# Patient Record
Sex: FEMALE | Race: WHITE | NOT HISPANIC OR LATINO | URBAN - METROPOLITAN AREA
[De-identification: names, ages, dates, MRNs, and addresses within clinical notes are randomized per-mention and may not be internally consistent; named-entity substitution may affect disease eponyms.]

---

## 2024-09-09 ENCOUNTER — APPOINTMENT (OUTPATIENT)
Dept: CT IMAGING | Facility: HOSPITAL | Age: 29
End: 2024-09-09
Payer: COMMERCIAL

## 2024-09-09 ENCOUNTER — HOSPITAL ENCOUNTER (EMERGENCY)
Facility: HOSPITAL | Age: 29
Discharge: HOME | End: 2024-09-09
Attending: EMERGENCY MEDICINE | Admitting: EMERGENCY MEDICINE
Payer: COMMERCIAL

## 2024-09-09 VITALS
HEIGHT: 66 IN | HEART RATE: 104 BPM | TEMPERATURE: 98.7 F | BODY MASS INDEX: 40.18 KG/M2 | WEIGHT: 250 LBS | SYSTOLIC BLOOD PRESSURE: 194 MMHG | OXYGEN SATURATION: 99 % | DIASTOLIC BLOOD PRESSURE: 134 MMHG | RESPIRATION RATE: 16 BRPM

## 2024-09-09 DIAGNOSIS — N39.0 ACUTE UTI: ICD-10-CM

## 2024-09-09 DIAGNOSIS — R10.84 GENERALIZED ABDOMINAL PAIN: Primary | ICD-10-CM

## 2024-09-09 LAB
ALBUMIN SERPL-MCNC: 3.7 G/DL (ref 3.5–5.2)
ALBUMIN/GLOB SERPL: 1 G/DL
ALP SERPL-CCNC: 81 U/L (ref 39–117)
ALT SERPL W P-5'-P-CCNC: 12 U/L (ref 1–33)
ANION GAP SERPL CALCULATED.3IONS-SCNC: 15 MMOL/L (ref 5–15)
AST SERPL-CCNC: 18 U/L (ref 1–32)
B-HCG UR QL: NEGATIVE
BACTERIA UR QL AUTO: ABNORMAL /HPF
BASOPHILS # BLD AUTO: 0.02 10*3/MM3 (ref 0–0.2)
BASOPHILS NFR BLD AUTO: 0.4 % (ref 0–1.5)
BILIRUB SERPL-MCNC: 0.5 MG/DL (ref 0–1.2)
BILIRUB UR QL STRIP: NEGATIVE
BUN SERPL-MCNC: 20 MG/DL (ref 6–20)
BUN/CREAT SERPL: 29.9 (ref 7–25)
CALCIUM SPEC-SCNC: 8.6 MG/DL (ref 8.6–10.5)
CHLORIDE SERPL-SCNC: 101 MMOL/L (ref 98–107)
CLARITY UR: ABNORMAL
CO2 SERPL-SCNC: 20 MMOL/L (ref 22–29)
COLOR UR: ABNORMAL
CREAT SERPL-MCNC: 0.67 MG/DL (ref 0.57–1)
DEPRECATED RDW RBC AUTO: 44 FL (ref 37–54)
EGFRCR SERPLBLD CKD-EPI 2021: 120.8 ML/MIN/1.73
EOSINOPHIL # BLD AUTO: 0 10*3/MM3 (ref 0–0.4)
EOSINOPHIL NFR BLD AUTO: 0 % (ref 0.3–6.2)
ERYTHROCYTE [DISTWIDTH] IN BLOOD BY AUTOMATED COUNT: 13.3 % (ref 12.3–15.4)
EXPIRATION DATE: NORMAL
FLUAV SUBTYP SPEC NAA+PROBE: NOT DETECTED
FLUBV RNA ISLT QL NAA+PROBE: NOT DETECTED
GLOBULIN UR ELPH-MCNC: 3.8 GM/DL
GLUCOSE SERPL-MCNC: 87 MG/DL (ref 65–99)
GLUCOSE UR STRIP-MCNC: NEGATIVE MG/DL
HCT VFR BLD AUTO: 41.1 % (ref 34–46.6)
HGB BLD-MCNC: 13.8 G/DL (ref 12–15.9)
HGB UR QL STRIP.AUTO: NEGATIVE
HYALINE CASTS UR QL AUTO: ABNORMAL /LPF
IMM GRANULOCYTES # BLD AUTO: 0.01 10*3/MM3 (ref 0–0.05)
IMM GRANULOCYTES NFR BLD AUTO: 0.2 % (ref 0–0.5)
INTERNAL NEGATIVE CONTROL: NEGATIVE
INTERNAL POSITIVE CONTROL: POSITIVE
KETONES UR QL STRIP: ABNORMAL
LEUKOCYTE ESTERASE UR QL STRIP.AUTO: ABNORMAL
LIPASE SERPL-CCNC: 21 U/L (ref 13–60)
LYMPHOCYTES # BLD AUTO: 1.03 10*3/MM3 (ref 0.7–3.1)
LYMPHOCYTES NFR BLD AUTO: 18.5 % (ref 19.6–45.3)
Lab: NORMAL
MCH RBC QN AUTO: 30.2 PG (ref 26.6–33)
MCHC RBC AUTO-ENTMCNC: 33.6 G/DL (ref 31.5–35.7)
MCV RBC AUTO: 89.9 FL (ref 79–97)
MONOCYTES # BLD AUTO: 0.34 10*3/MM3 (ref 0.1–0.9)
MONOCYTES NFR BLD AUTO: 6.1 % (ref 5–12)
MUCOUS THREADS URNS QL MICRO: ABNORMAL /HPF
NEUTROPHILS NFR BLD AUTO: 4.18 10*3/MM3 (ref 1.7–7)
NEUTROPHILS NFR BLD AUTO: 74.8 % (ref 42.7–76)
NITRITE UR QL STRIP: NEGATIVE
NRBC BLD AUTO-RTO: 0 /100 WBC (ref 0–0.2)
PH UR STRIP.AUTO: 5.5 [PH] (ref 5–8)
PLATELET # BLD AUTO: 204 10*3/MM3 (ref 140–450)
PMV BLD AUTO: 9.5 FL (ref 6–12)
POTASSIUM SERPL-SCNC: 3.9 MMOL/L (ref 3.5–5.2)
PROT SERPL-MCNC: 7.5 G/DL (ref 6–8.5)
PROT UR QL STRIP: ABNORMAL
RBC # BLD AUTO: 4.57 10*6/MM3 (ref 3.77–5.28)
RBC # UR STRIP: ABNORMAL /HPF
REF LAB TEST METHOD: ABNORMAL
SARS-COV-2 RNA RESP QL NAA+PROBE: NOT DETECTED
SODIUM SERPL-SCNC: 136 MMOL/L (ref 136–145)
SP GR UR STRIP: 1.04 (ref 1–1.03)
SQUAMOUS #/AREA URNS HPF: ABNORMAL /HPF
UROBILINOGEN UR QL STRIP: ABNORMAL
WBC # UR STRIP: ABNORMAL /HPF
WBC NRBC COR # BLD AUTO: 5.58 10*3/MM3 (ref 3.4–10.8)

## 2024-09-09 PROCEDURE — 96365 THER/PROPH/DIAG IV INF INIT: CPT

## 2024-09-09 PROCEDURE — 87636 SARSCOV2 & INF A&B AMP PRB: CPT | Performed by: EMERGENCY MEDICINE

## 2024-09-09 PROCEDURE — 87086 URINE CULTURE/COLONY COUNT: CPT | Performed by: EMERGENCY MEDICINE

## 2024-09-09 PROCEDURE — 25810000003 SODIUM CHLORIDE 0.9 % SOLUTION: Performed by: EMERGENCY MEDICINE

## 2024-09-09 PROCEDURE — 83690 ASSAY OF LIPASE: CPT | Performed by: EMERGENCY MEDICINE

## 2024-09-09 PROCEDURE — 74177 CT ABD & PELVIS W/CONTRAST: CPT

## 2024-09-09 PROCEDURE — 81025 URINE PREGNANCY TEST: CPT | Performed by: EMERGENCY MEDICINE

## 2024-09-09 PROCEDURE — 25010000002 ONDANSETRON PER 1 MG: Performed by: EMERGENCY MEDICINE

## 2024-09-09 PROCEDURE — 80053 COMPREHEN METABOLIC PANEL: CPT | Performed by: EMERGENCY MEDICINE

## 2024-09-09 PROCEDURE — 25010000002 HYDROMORPHONE PER 4 MG: Performed by: EMERGENCY MEDICINE

## 2024-09-09 PROCEDURE — 25010000002 CEFTRIAXONE PER 250 MG: Performed by: EMERGENCY MEDICINE

## 2024-09-09 PROCEDURE — 96375 TX/PRO/DX INJ NEW DRUG ADDON: CPT

## 2024-09-09 PROCEDURE — 99285 EMERGENCY DEPT VISIT HI MDM: CPT

## 2024-09-09 PROCEDURE — 81001 URINALYSIS AUTO W/SCOPE: CPT | Performed by: EMERGENCY MEDICINE

## 2024-09-09 PROCEDURE — 25510000001 IOPAMIDOL 61 % SOLUTION: Performed by: EMERGENCY MEDICINE

## 2024-09-09 PROCEDURE — 85025 COMPLETE CBC W/AUTO DIFF WBC: CPT | Performed by: EMERGENCY MEDICINE

## 2024-09-09 RX ORDER — IOPAMIDOL 612 MG/ML
100 INJECTION, SOLUTION INTRAVASCULAR
Status: COMPLETED | OUTPATIENT
Start: 2024-09-09 | End: 2024-09-09

## 2024-09-09 RX ORDER — CEFUROXIME AXETIL 250 MG/1
500 TABLET ORAL 2 TIMES DAILY
Qty: 28 TABLET | Refills: 0 | Status: SHIPPED | OUTPATIENT
Start: 2024-09-09 | End: 2024-09-16

## 2024-09-09 RX ORDER — HYDROMORPHONE HYDROCHLORIDE 1 MG/ML
0.5 INJECTION, SOLUTION INTRAMUSCULAR; INTRAVENOUS; SUBCUTANEOUS ONCE
Status: COMPLETED | OUTPATIENT
Start: 2024-09-09 | End: 2024-09-09

## 2024-09-09 RX ORDER — SODIUM CHLORIDE 0.9 % (FLUSH) 0.9 %
10 SYRINGE (ML) INJECTION AS NEEDED
Status: DISCONTINUED | OUTPATIENT
Start: 2024-09-09 | End: 2024-09-09 | Stop reason: HOSPADM

## 2024-09-09 RX ORDER — ONDANSETRON 4 MG/1
4 TABLET, FILM COATED ORAL EVERY 8 HOURS PRN
Qty: 15 TABLET | Refills: 0 | Status: SHIPPED | OUTPATIENT
Start: 2024-09-09

## 2024-09-09 RX ORDER — DICYCLOMINE HCL 20 MG
20 TABLET ORAL EVERY 8 HOURS PRN
Qty: 20 TABLET | Refills: 0 | Status: SHIPPED | OUTPATIENT
Start: 2024-09-09

## 2024-09-09 RX ORDER — ONDANSETRON 2 MG/ML
4 INJECTION INTRAMUSCULAR; INTRAVENOUS ONCE
Status: COMPLETED | OUTPATIENT
Start: 2024-09-09 | End: 2024-09-09

## 2024-09-09 RX ADMIN — SODIUM CHLORIDE 1000 ML: 9 INJECTION, SOLUTION INTRAVENOUS at 16:47

## 2024-09-09 RX ADMIN — HYDROMORPHONE HYDROCHLORIDE 0.5 MG: 1 INJECTION, SOLUTION INTRAMUSCULAR; INTRAVENOUS; SUBCUTANEOUS at 16:47

## 2024-09-09 RX ADMIN — SODIUM CHLORIDE 2000 MG: 900 INJECTION INTRAVENOUS at 17:27

## 2024-09-09 RX ADMIN — ONDANSETRON 4 MG: 2 INJECTION INTRAMUSCULAR; INTRAVENOUS at 16:47

## 2024-09-09 RX ADMIN — IOPAMIDOL 90 ML: 612 INJECTION, SOLUTION INTRAVENOUS at 17:43

## 2024-09-09 NOTE — ED PROVIDER NOTES
Subjective   History of Present Illness  29-year-old female sent to the emergency department from urgent care to be evaluated for right lower quadrant abdominal pain.  The patient tells me that her symptoms started yesterday evening and have persisted since that time.  The pain seems to be worst in her right lower quadrant and radiates to her back.  She notes accompanying nausea and vomiting and some loose stools as well.  No known dietary triggers for her symptoms.  No known sick contacts.  She currently rates her pain at 8 out of 10 in severity and notes that the pain is worse with palpation.  She went to urgent care regarding her symptoms and there was both febrile and tachycardic.  There was concern for potential appendicitis so she was sent to the ED to be evaluated.      Review of Systems   Constitutional:  Positive for fever.   Gastrointestinal:  Positive for abdominal pain, diarrhea, nausea and vomiting.   All other systems reviewed and are negative.      No past medical history on file.    Allergies   Allergen Reactions    Minocycline Other (See Comments)     Hydrocephalus as child    Red Dye Other (See Comments)     Severe vomiting       No past surgical history on file.    No family history on file.    Social History     Socioeconomic History    Marital status: Single           Objective   Physical Exam  Vitals and nursing note reviewed.   Constitutional:       General: She is not in acute distress.     Appearance: She is well-developed. She is not diaphoretic.      Comments: Nontoxic-appearing female   HENT:      Head: Normocephalic and atraumatic.   Eyes:      Pupils: Pupils are equal, round, and reactive to light.   Cardiovascular:      Rate and Rhythm: Regular rhythm. Tachycardia present.      Heart sounds: Normal heart sounds. No murmur heard.     No friction rub. No gallop.   Pulmonary:      Effort: Pulmonary effort is normal. No respiratory distress.      Breath sounds: Normal breath sounds. No  wheezing or rales.   Abdominal:      General: Bowel sounds are normal. There is no distension.      Palpations: Abdomen is soft. There is no mass.      Tenderness: There is abdominal tenderness. There is guarding. There is no rebound.      Comments: Generalized abdominal tenderness present, guarding noted with palpation of right lower quadrant, no pain out of proportion to exam, negative Bartlett's sign   Genitourinary:     Comments: No CVA tenderness noted  Musculoskeletal:         General: Normal range of motion.      Cervical back: Neck supple.   Skin:     General: Skin is warm and dry.      Findings: No erythema or rash.      Comments: No dermatomal rash present   Neurological:      Mental Status: She is alert and oriented to person, place, and time.   Psychiatric:         Mood and Affect: Mood normal.         Thought Content: Thought content normal.         Judgment: Judgment normal.         Procedures           ED Course  ED Course as of 09/09/24 1806   Mon Sep 09, 2024   1602 29-year-old female sent to the emergency department to be evaluated for right lower quadrant abdominal pain.  She tells me her symptoms started yesterday evening and have persisted since that time.  The pain seems to be worst in her right lower quadrant and radiates to her back.  She endorses coming nausea and vomiting.  No known sick contacts or dietary triggers.  On arrival to the ED, the patient is uncomfortable appearing.  She is tachycardic.  Exam remarkable for generalized abdominal tenderness with focal tenderness noted with palpation of right lower quadrant with guarding noted.  No pain out of proportion to exam.  No CVA tenderness noted.  No dermatomal rash present.  Broad differential diagnosis.  We will obtain labs and imaging, and we will reassess following initial interventions. [DD]   1718 Labs are bland/unrevealing. [DD]   1721 Urinalysis is suggestive of infection.  Urine culture obtained.  Rocephin given. [DD]   1759 I  personally and independently reviewed the patient's CT images and findings, and I am in agreement with the radiologist regarding CT interpretation--particularly regarding findings somewhat equivocal but unlikely for appendicitis.    Upon reevaluation, the patient looks much improved.  She is requesting to eat and drink.  Her abdominal tenderness at this point seems more generalized than localized to her right lower quadrant. [DD]   1800 After reviewing the patient's labs and imaging, I discussed the patient's case with our on-call surgeon, Dr. Resendez. [DD]   1800 He agreed that given the patient's CT findings and overall clinical picture, that acute appendicitis was unlikely.  He felt that the patient could be discharged home with appropriate strict return precautions [DD]   1800 .  I agree with his expert assessment.  Prescription for Bentyl and Zofran as needed.  The patient will follow-up with her primary care physician within the next week.  Agreeable with plan and given appropriate strict return precautions. [DD]   1800 Prescription for Ceftin for UTI. [DD]      ED Course User Index  [DD] Arnol Ledesma MD                                   Recent Results (from the past 24 hour(s))   Covid-19 + Flu A&B AG, Veritor ([id number])    Collection Time: 09/09/24  2:51 PM    Specimen: Swab   Result Value Ref Range    SARS Antigen Not Detected Not Detected, Presumptive Negative    Influenza A Antigen RASHID Not Detected Not Detected    Influenza B Antigen RASHID Not Detected Not Detected    Internal Control Passed Passed    Lot Number [id number]     Expiration Date 02/10/2025    Comprehensive Metabolic Panel    Collection Time: 09/09/24  4:39 PM    Specimen: Blood   Result Value Ref Range    Glucose 87 65 - 99 mg/dL    BUN 20 6 - 20 mg/dL    Creatinine 0.67 0.57 - 1.00 mg/dL    Sodium 136 136 - 145 mmol/L    Potassium 3.9 3.5 - 5.2 mmol/L    Chloride 101 98 - 107 mmol/L    CO2 20.0 (L) 22.0 - 29.0 mmol/L    Calcium 8.6 8.6 -  10.5 mg/dL    Total Protein 7.5 6.0 - 8.5 g/dL    Albumin 3.7 3.5 - 5.2 g/dL    ALT (SGPT) 12 1 - 33 U/L    AST (SGOT) 18 1 - 32 U/L    Alkaline Phosphatase 81 39 - 117 U/L    Total Bilirubin 0.5 0.0 - 1.2 mg/dL    Globulin 3.8 gm/dL    A/G Ratio 1.0 g/dL    BUN/Creatinine Ratio 29.9 (H) 7.0 - 25.0    Anion Gap 15.0 5.0 - 15.0 mmol/L    eGFR 120.8 >60.0 mL/min/1.73   Lipase    Collection Time: 09/09/24  4:39 PM    Specimen: Blood   Result Value Ref Range    Lipase 21 13 - 60 U/L   Urinalysis With Culture If Indicated - Urine, Clean Catch    Collection Time: 09/09/24  4:39 PM    Specimen: Urine, Clean Catch   Result Value Ref Range    Color, UA Dark Yellow (A) Yellow, Straw    Appearance, UA Turbid (A) Clear    pH, UA 5.5 5.0 - 8.0    Specific Gravity, UA 1.038 (H) 1.001 - 1.030    Glucose, UA Negative Negative    Ketones, UA Trace (A) Negative    Bilirubin, UA Negative Negative    Blood, UA Negative Negative    Protein, UA 30 mg/dL (1+) (A) Negative    Leuk Esterase, UA Moderate (2+) (A) Negative    Nitrite, UA Negative Negative    Urobilinogen, UA 0.2 E.U./dL 0.2 - 1.0 E.U./dL   CBC Auto Differential    Collection Time: 09/09/24  4:39 PM    Specimen: Blood   Result Value Ref Range    WBC 5.58 3.40 - 10.80 10*3/mm3    RBC 4.57 3.77 - 5.28 10*6/mm3    Hemoglobin 13.8 12.0 - 15.9 g/dL    Hematocrit 41.1 34.0 - 46.6 %    MCV 89.9 79.0 - 97.0 fL    MCH 30.2 26.6 - 33.0 pg    MCHC 33.6 31.5 - 35.7 g/dL    RDW 13.3 12.3 - 15.4 %    RDW-SD 44.0 37.0 - 54.0 fl    MPV 9.5 6.0 - 12.0 fL    Platelets 204 140 - 450 10*3/mm3    Neutrophil % 74.8 42.7 - 76.0 %    Lymphocyte % 18.5 (L) 19.6 - 45.3 %    Monocyte % 6.1 5.0 - 12.0 %    Eosinophil % 0.0 (L) 0.3 - 6.2 %    Basophil % 0.4 0.0 - 1.5 %    Immature Grans % 0.2 0.0 - 0.5 %    Neutrophils, Absolute 4.18 1.70 - 7.00 10*3/mm3    Lymphocytes, Absolute 1.03 0.70 - 3.10 10*3/mm3    Monocytes, Absolute 0.34 0.10 - 0.90 10*3/mm3    Eosinophils, Absolute 0.00 0.00 - 0.40 10*3/mm3     Basophils, Absolute 0.02 0.00 - 0.20 10*3/mm3    Immature Grans, Absolute 0.01 0.00 - 0.05 10*3/mm3    nRBC 0.0 0.0 - 0.2 /100 WBC   Urinalysis, Microscopic Only - Urine, Clean Catch    Collection Time: 09/09/24  4:39 PM    Specimen: Urine, Clean Catch   Result Value Ref Range    RBC, UA 0-2 None Seen, 0-2 /HPF    WBC, UA 11-20 (A) None Seen, 0-2 /HPF    Bacteria, UA 4+ (A) None Seen, Trace /HPF    Squamous Epithelial Cells, UA 21-30 (A) None Seen, 0-2 /HPF    Hyaline Casts, UA 0-6 0 - 6 /LPF    Mucus, UA Moderate/2+ (A) None Seen, Trace /HPF    Methodology Manual Light Microscopy    POC Urine Pregnancy    Collection Time: 09/09/24  4:40 PM    Specimen: Urine   Result Value Ref Range    HCG, Urine, QL Negative Negative    Lot Number [id number]     Internal Positive Control Positive Positive, Passed    Internal Negative Control Negative Negative, Passed    Expiration Date 2025-01-28    COVID-19 and FLU A/B PCR, 1 HR TAT - Swab, Nasopharynx    Collection Time: 09/09/24  4:41 PM    Specimen: Nasopharynx; Swab   Result Value Ref Range    COVID19 Not Detected Not Detected - Ref. Range    Influenza A PCR Not Detected Not Detected    Influenza B PCR Not Detected Not Detected     Note: In addition to lab results from this visit, the labs listed above may include labs taken at another facility or during a different encounter within the last 24 hours. Please correlate lab times with ED admission and discharge times for further clarification of the services performed during this visit.    CT Abdomen Pelvis With Contrast   Final Result   Impression:   1.Scattered colonic fluid may indicate acute diarrheal illness.   2.Fluid-filled appendix at upper limit of normal in caliber without periappendiceal stranding noted. Appearance is favored secondary to changes of the colon. Early appendicitis is considered less likely. Please correlate clinically.   3.Additional findings as detailed above.         Electronically Signed: Corey  "MD Gopal     9/9/2024 5:53 PM EDT     Workstation ID: [id number]        Vitals:    09/09/24 1556 09/09/24 1700   BP: (!) 139/101 (!) 141/106   BP Location: Left arm    Patient Position: Sitting    Pulse: (!) 126 108   Resp: 16    Temp: 98.7 °F (37.1 °C)    TempSrc: Oral    SpO2: 98% 96%   Weight: 113 kg (250 lb)    Height: 167.6 cm (66\")      Medications   sodium chloride 0.9 % flush 10 mL (has no administration in time range)   sodium chloride 0.9 % flush 10 mL (has no administration in time range)   sodium chloride 0.9 % bolus 1,000 mL (0 mL Intravenous Stopped 9/9/24 1803)   HYDROmorphone (DILAUDID) injection 0.5 mg (0.5 mg Intravenous Given 9/9/24 1647)   ondansetron (ZOFRAN) injection 4 mg (4 mg Intravenous Given 9/9/24 1647)   cefTRIAXone (ROCEPHIN) 2,000 mg in sodium chloride 0.9 % 100 mL MBP (2,000 mg Intravenous New Bag 9/9/24 1727)   iopamidol (ISOVUE-300) 61 % injection 100 mL (90 mL Intravenous Given 9/9/24 1743)     ECG/EMG Results (last 24 hours)       ** No results found for the last 24 hours. **          No orders to display                 Medical Decision Making  Problems Addressed:  Acute UTI: complicated acute illness or injury  Generalized abdominal pain: complicated acute illness or injury    Amount and/or Complexity of Data Reviewed  Labs: ordered.  Radiology: ordered.    Risk  Prescription drug management.        Final diagnoses:   Generalized abdominal pain   Acute UTI       ED Disposition  ED Disposition       ED Disposition   Discharge    Condition   Stable    Comment   --               PATIENT CONNECTION - [facility] [address]  [phone]  In 1 week           Medication List        New Prescriptions      cefuroxime 250 MG tablet  Commonly known as: CEFTIN  Take 2 tablets by mouth 2 (Two) Times a Day for 7 days.     dicyclomine 20 MG tablet  Commonly known as: BENTYL  Take 1 tablet by mouth Every 8 (Eight) Hours As Needed for Abdominal Cramping.     ondansetron 4 MG " tablet  Commonly known as: ZOFRAN  Take 1 tablet by mouth Every 8 (Eight) Hours As Needed for Nausea.               Where to Get Your Medications        These medications were sent to [facility] - [place], [place] - [address]  - [phone]  - [phone]   [address], [facility] [address]      Phone: [phone]   cefuroxime 250 MG tablet  dicyclomine 20 MG tablet  ondansetron 4 MG tablet            Arnol Ledesma MD  09/09/24 8219

## 2024-09-10 LAB — BACTERIA SPEC AEROBE CULT: NORMAL

## 2024-10-04 ENCOUNTER — OFFICE VISIT (OUTPATIENT)
Dept: FAMILY MEDICINE CLINIC | Facility: CLINIC | Age: 29
End: 2024-10-04
Payer: COMMERCIAL

## 2024-10-04 VITALS
HEIGHT: 66 IN | BODY MASS INDEX: 43.23 KG/M2 | HEART RATE: 82 BPM | SYSTOLIC BLOOD PRESSURE: 165 MMHG | DIASTOLIC BLOOD PRESSURE: 104 MMHG | OXYGEN SATURATION: 96 % | WEIGHT: 269 LBS

## 2024-10-04 DIAGNOSIS — N30.00 ACUTE CYSTITIS WITHOUT HEMATURIA: ICD-10-CM

## 2024-10-04 DIAGNOSIS — I10 PRIMARY HYPERTENSION: Primary | ICD-10-CM

## 2024-10-04 DIAGNOSIS — E66.01 CLASS 3 SEVERE OBESITY DUE TO EXCESS CALORIES WITHOUT SERIOUS COMORBIDITY WITH BODY MASS INDEX (BMI) OF 40.0 TO 44.9 IN ADULT: ICD-10-CM

## 2024-10-04 DIAGNOSIS — E66.813 CLASS 3 SEVERE OBESITY DUE TO EXCESS CALORIES WITHOUT SERIOUS COMORBIDITY WITH BODY MASS INDEX (BMI) OF 40.0 TO 44.9 IN ADULT: ICD-10-CM

## 2024-10-04 DIAGNOSIS — E73.9: ICD-10-CM

## 2024-10-04 DIAGNOSIS — F41.8 DEPRESSION WITH ANXIETY: ICD-10-CM

## 2024-10-04 DIAGNOSIS — L20.82 FLEXURAL ECZEMA: ICD-10-CM

## 2024-10-04 DIAGNOSIS — Z23 ENCOUNTER FOR IMMUNIZATION: ICD-10-CM

## 2024-10-04 PROBLEM — K21.9 GASTROESOPHAGEAL REFLUX DISEASE: Status: ACTIVE | Noted: 2021-08-15

## 2024-10-04 PROBLEM — K58.9 ADAPTIVE COLITIS: Status: ACTIVE | Noted: 2024-10-04

## 2024-10-04 PROBLEM — R56.9 SEIZURES: Status: ACTIVE | Noted: 2021-10-01

## 2024-10-04 LAB
BILIRUB BLD-MCNC: NEGATIVE MG/DL
CLARITY, POC: ABNORMAL
COLOR UR: YELLOW
EXPIRATION DATE: ABNORMAL
GLUCOSE UR STRIP-MCNC: NEGATIVE MG/DL
KETONES UR QL: NEGATIVE
LEUKOCYTE EST, POC: ABNORMAL
Lab: ABNORMAL
NITRITE UR-MCNC: NEGATIVE MG/ML
PH UR: 7 [PH] (ref 5–8)
PROT UR STRIP-MCNC: ABNORMAL MG/DL
RBC # UR STRIP: ABNORMAL /UL
SP GR UR: 1.01 (ref 1–1.03)
UROBILINOGEN UR QL: NORMAL

## 2024-10-04 PROCEDURE — 87798 DETECT AGENT NOS DNA AMP: CPT | Performed by: FAMILY MEDICINE

## 2024-10-04 PROCEDURE — 87086 URINE CULTURE/COLONY COUNT: CPT | Performed by: FAMILY MEDICINE

## 2024-10-04 PROCEDURE — 99204 OFFICE O/P NEW MOD 45 MIN: CPT | Performed by: FAMILY MEDICINE

## 2024-10-04 PROCEDURE — 87591 N.GONORRHOEAE DNA AMP PROB: CPT | Performed by: FAMILY MEDICINE

## 2024-10-04 PROCEDURE — 81003 URINALYSIS AUTO W/O SCOPE: CPT | Performed by: FAMILY MEDICINE

## 2024-10-04 PROCEDURE — 87661 TRICHOMONAS VAGINALIS AMPLIF: CPT | Performed by: FAMILY MEDICINE

## 2024-10-04 PROCEDURE — 87491 CHLMYD TRACH DNA AMP PROBE: CPT | Performed by: FAMILY MEDICINE

## 2024-10-04 PROCEDURE — 87801 DETECT AGNT MULT DNA AMPLI: CPT | Performed by: FAMILY MEDICINE

## 2024-10-04 PROCEDURE — 90656 IIV3 VACC NO PRSV 0.5 ML IM: CPT | Performed by: FAMILY MEDICINE

## 2024-10-04 PROCEDURE — 90471 IMMUNIZATION ADMIN: CPT | Performed by: FAMILY MEDICINE

## 2024-10-04 RX ORDER — LISINOPRIL 10 MG/1
10 TABLET ORAL DAILY
Qty: 30 TABLET | Refills: 5 | Status: SHIPPED | OUTPATIENT
Start: 2024-10-04

## 2024-10-04 RX ORDER — TRIAMCINOLONE ACETONIDE 1 MG/G
1 OINTMENT TOPICAL 2 TIMES DAILY
Qty: 453.6 G | Refills: 5 | Status: SHIPPED | OUTPATIENT
Start: 2024-10-04

## 2024-10-04 RX ORDER — ESCITALOPRAM OXALATE 10 MG/1
10 TABLET ORAL DAILY
Qty: 30 TABLET | Refills: 5 | Status: SHIPPED | OUTPATIENT
Start: 2024-10-04

## 2024-10-04 NOTE — ASSESSMENT & PLAN NOTE
Patient's depression is a recurrent episode that is mild without psychosis. Depression is active and newly identified.    Plan:   Begin new antidepressant medicine; Lexapro    Followup in 4 weeks.

## 2024-10-04 NOTE — ASSESSMENT & PLAN NOTE
Recommend topical triamcinolone along with Aquaphor.  Will consider referral to either dermatology or allergist if needed

## 2024-10-04 NOTE — ASSESSMENT & PLAN NOTE
Patient's (Body mass index is 43.44 kg/m².) indicates that they are morbidly/severely obese (BMI > 40 or > 35 with obesity - related health condition) with health conditions that include hypertension . Weight is newly identified. BMI  is above average; BMI management plan is completed. We discussed Information on healthy weight added to patient's after visit summary.

## 2024-10-04 NOTE — PROGRESS NOTES
New Patient Office Visit      Date: 10/04/2024   Patient Name: Kenya Nicholson  : 1995   MRN: [id number]     Chief Complaint:    Chief Complaint   Patient presents with    Eczema     On both hands     Abdominal Pain     Follow up     Depression     Pt states she would like to discuss getting back on medications , the last one she tried was zoloft        History of Present Illness: Kenya Nicholson is a 29 y.o. female who is here today to establish care.  She is originally from [place].  She can lives at home with her  and 3-year-old son.  She works for the [email or web address] as a     Patient notes that recently she has been having high blood pressure readings.  She did have some gestational hypertension during her pregnancy.  She is currently not on blood pressure a tubal be interested in starting one.  She can monitor her blood pressure at home.  Patient also notes that since her delivery of her son she has had lactose intolerance.  She did have an event about a month ago where she presented to the emergency room and had some abnormal findings on her abdominal CT.  She is interested in establishing with gastroenterology for further evaluation.  Patient also has a history of eczema and is seem to be flaring on her hands.  She has been using over-the-counter eczema topicals with minimal relief.  She is interested in seeing a dermatologist for this and is established with [facility].    Patient is also here to discuss her depression and anxiety.  She notes that previously was diagnosed with anxiety and prescribed Zoloft and Wellbutrin in the past.  She does note an intolerance to Wellbutrin.  She also previously participated in talk therapy but has been sometime and she is interested in new referral.  She is interested in starting a new agent today.    Patient is a G1, P1 with  delivery.  She has a history of preeclampsia.  Her last Pap smear was in August with Dr. Lozano at [facility] and was reportedly normal.  She does have a family history of uterine cancer.      Subjective      Review of Systems:   Review of Systems   Skin:  Positive for rash.   Psychiatric/Behavioral:  Positive for depressed mood.    All other systems reviewed and are negative.      Past Medical History: History reviewed. No pertinent past medical history.    Past Surgical History:   Past Surgical History:   Procedure Laterality Date    ANKLE SURGERY Right      SECTION      OVARY SURGERY Left        Family History: History reviewed. No pertinent family history.    Social History:   Social History     Socioeconomic History    Marital status:    Tobacco Use    Smoking status: Never    Smokeless tobacco: Never   Vaping Use    Vaping status: Never Used   Substance and Sexual Activity    Alcohol use: Never    Drug use: Never       Medications:     Current Outpatient Medications:     Drospirenone-Ethinyl Estradiol (SINAN PO), Take  by mouth., Disp: , Rfl:     escitalopram (Lexapro) 10 MG tablet, Take 1 tablet by mouth Daily., Disp: 30 tablet, Rfl: 5    lisinopril (PRINIVIL,ZESTRIL) 10 MG tablet, Take 1 tablet by mouth Daily., Disp: 30 tablet, Rfl: 5    triamcinolone (KENALOG) 0.1 % ointment, Apply 1 Application topically to the appropriate area as directed 2 (Two) Times a Day., Disp: 453.6 g, Rfl: 5    Allergies:   Allergies   Allergen Reactions    Minocycline Other (See Comments)     Hydrocephalus as child    Red Dye Other (See Comments)     Severe vomiting       Objective     Physical Exam:  Vital Signs:   Vitals:    10/04/24 1116   BP: (!) 165/104   Pulse: 82   SpO2: 96%   Weight: 122 kg (269 lb)   Height: 167.6 cm (65.98\")     Body mass index is 43.44 kg/m².         Physical Exam  Vitals and nursing note reviewed.   Constitutional:       Appearance: Normal appearance. She is obese.   HENT:      Head: Normocephalic and atraumatic.      Nose: Nose normal.      Mouth/Throat:      Mouth: Mucous membranes are " moist.   Eyes:      Extraocular Movements: Extraocular movements intact.      Pupils: Pupils are equal, round, and reactive to light.   Cardiovascular:      Rate and Rhythm: Normal rate and regular rhythm.      Heart sounds: Normal heart sounds.   Pulmonary:      Effort: Pulmonary effort is normal.      Breath sounds: Normal breath sounds.   Abdominal:      General: Abdomen is flat.   Musculoskeletal:         General: Normal range of motion.      Cervical back: Normal range of motion.   Skin:     General: Skin is warm.      Comments: Rash on bilateral hands   Neurological:      General: No focal deficit present.      Mental Status: She is alert and oriented to person, place, and time.   Psychiatric:         Mood and Affect: Mood normal.         Behavior: Behavior normal.         Thought Content: Thought content normal.         Judgment: Judgment normal.         Procedures    Results:   PHQ-9 Total Score: 9          Assessment / Plan      Assessment/Plan:   Diagnoses and all orders for this visit:    1. Primary hypertension (Primary)  Assessment & Plan:  Patient has new onset Hypertension  Ambulatory BP monitoring  Medication changes per orders.  Recommended strategies for weight loss.  Counseled on importance of healthy eating and regular aerobic exercise  Advised to check BP regularly and call office if frequently >140/90  Blood pressure will be reassessed in 4 weeks.    Orders:  -     lisinopril (PRINIVIL,ZESTRIL) 10 MG tablet; Take 1 tablet by mouth Daily.  Dispense: 30 tablet; Refill: 5    2. Class 3 severe obesity due to excess calories without serious comorbidity with body mass index (BMI) of 40.0 to 44.9 in adult  Assessment & Plan:  Patient's (Body mass index is 43.44 kg/m².) indicates that they are morbidly/severely obese (BMI > 40 or > 35 with obesity - related health condition) with health conditions that include hypertension . Weight is newly identified. BMI  is above average; BMI management plan is  completed. We discussed Information on healthy weight added to patient's after visit summary.       3. Flexural eczema  Assessment & Plan:  Recommend topical triamcinolone along with Aquaphor.  Will consider referral to either dermatology or allergist if needed    Orders:  -     triamcinolone (KENALOG) 0.1 % ointment; Apply 1 Application topically to the appropriate area as directed 2 (Two) Times a Day.  Dispense: 453.6 g; Refill: 5    4. Acute cystitis without hematuria  -     POCT urinalysis dipstick, automated  -     NuSwab VG+ - Swab, Vagina; Future  -     Urine Culture - Urine, Urine, Clean Catch; Future    5. Encounter for immunization  -     Fluzone >6mos ([id number])    6. Depression with anxiety  Assessment & Plan:  Patient's depression is a recurrent episode that is mild without psychosis. Depression is active and newly identified.    Plan:   Begin new antidepressant medicine; Lexapro    Followup in 4 weeks.     Orders:  -     escitalopram (Lexapro) 10 MG tablet; Take 1 tablet by mouth Daily.  Dispense: 30 tablet; Refill: 5  -     Ambulatory Referral to Behavioral Health    7. Late-onset lactose intolerance  Assessment & Plan:  Reviewed recent labs and CT report.  Will refer to gastroenterology for eval and treat    Orders:  -     Ambulatory Referral to Gastroenterology         Follow Up:   Return in about 4 weeks (around 11/1/2024) for Annual physical.    Kacey Chase DO   [facility] [place]

## 2024-10-04 NOTE — ASSESSMENT & PLAN NOTE
Patient has new onset Hypertension  Ambulatory BP monitoring  Medication changes per orders.  Recommended strategies for weight loss.  Counseled on importance of healthy eating and regular aerobic exercise  Advised to check BP regularly and call office if frequently >140/90  Blood pressure will be reassessed in 4 weeks.

## 2024-10-05 ENCOUNTER — PATIENT MESSAGE (OUTPATIENT)
Dept: FAMILY MEDICINE CLINIC | Facility: CLINIC | Age: 29
End: 2024-10-05
Payer: COMMERCIAL

## 2024-10-05 DIAGNOSIS — N89.8 VAGINAL IRRITATION: Primary | ICD-10-CM

## 2024-10-06 LAB — BACTERIA SPEC AEROBE CULT: NORMAL

## 2024-10-07 LAB
A VAGINAE DNA VAG QL NAA+PROBE: ABNORMAL SCORE
BVAB2 DNA VAG QL NAA+PROBE: ABNORMAL SCORE
C ALBICANS DNA VAG QL NAA+PROBE: POSITIVE
C GLABRATA DNA VAG QL NAA+PROBE: NEGATIVE
C TRACH DNA SPEC QL NAA+PROBE: NEGATIVE
MEGA1 DNA VAG QL NAA+PROBE: ABNORMAL SCORE
N GONORRHOEA DNA VAG QL NAA+PROBE: NEGATIVE
T VAGINALIS DNA VAG QL NAA+PROBE: NEGATIVE

## 2024-10-07 RX ORDER — METRONIDAZOLE 7.5 MG/G
1 GEL VAGINAL NIGHTLY
Qty: 70 G | Refills: 0 | Status: SHIPPED | OUTPATIENT
Start: 2024-10-07 | End: 2024-10-12

## 2024-10-07 NOTE — TELEPHONE ENCOUNTER
From: Kenya Nicholson  To: Kacey Chase  Sent: 10/5/2024 11:58 AM EDT  Subject: Vaginal discomfort     I am experiencing extreme vaginal discomfort as well as some bleeding from vagina/labia. Is there any type of topical treatment that I can use to soothe symptoms while waiting for my test results?

## 2024-10-10 ENCOUNTER — PATIENT ROUNDING (BHMG ONLY) (OUTPATIENT)
Dept: FAMILY MEDICINE CLINIC | Facility: CLINIC | Age: 29
End: 2024-10-10
Payer: COMMERCIAL

## 2024-11-04 ENCOUNTER — LAB (OUTPATIENT)
Dept: LAB | Facility: HOSPITAL | Age: 29
End: 2024-11-04
Payer: COMMERCIAL

## 2024-11-04 ENCOUNTER — OFFICE VISIT (OUTPATIENT)
Dept: FAMILY MEDICINE CLINIC | Facility: CLINIC | Age: 29
End: 2024-11-04
Payer: COMMERCIAL

## 2024-11-04 VITALS
DIASTOLIC BLOOD PRESSURE: 82 MMHG | OXYGEN SATURATION: 96 % | BODY MASS INDEX: 43.1 KG/M2 | HEART RATE: 81 BPM | WEIGHT: 268.2 LBS | SYSTOLIC BLOOD PRESSURE: 124 MMHG | HEIGHT: 66 IN

## 2024-11-04 DIAGNOSIS — F41.8 DEPRESSION WITH ANXIETY: ICD-10-CM

## 2024-11-04 DIAGNOSIS — I10 PRIMARY HYPERTENSION: ICD-10-CM

## 2024-11-04 DIAGNOSIS — Z00.00 ANNUAL PHYSICAL EXAM: ICD-10-CM

## 2024-11-04 DIAGNOSIS — E66.813 CLASS 3 SEVERE OBESITY DUE TO EXCESS CALORIES WITHOUT SERIOUS COMORBIDITY WITH BODY MASS INDEX (BMI) OF 40.0 TO 44.9 IN ADULT: ICD-10-CM

## 2024-11-04 DIAGNOSIS — Z00.00 ANNUAL PHYSICAL EXAM: Primary | ICD-10-CM

## 2024-11-04 DIAGNOSIS — E66.01 CLASS 3 SEVERE OBESITY DUE TO EXCESS CALORIES WITHOUT SERIOUS COMORBIDITY WITH BODY MASS INDEX (BMI) OF 40.0 TO 44.9 IN ADULT: ICD-10-CM

## 2024-11-04 PROBLEM — N30.00 ACUTE CYSTITIS WITHOUT HEMATURIA: Status: RESOLVED | Noted: 2024-10-04 | Resolved: 2024-11-04

## 2024-11-04 LAB
ALBUMIN SERPL-MCNC: 3.6 G/DL (ref 3.5–5.2)
ALBUMIN/GLOB SERPL: 0.9 G/DL
ALP SERPL-CCNC: 79 U/L (ref 39–117)
ALT SERPL W P-5'-P-CCNC: 12 U/L (ref 1–33)
ANION GAP SERPL CALCULATED.3IONS-SCNC: 13.1 MMOL/L (ref 5–15)
AST SERPL-CCNC: 13 U/L (ref 1–32)
BILIRUB SERPL-MCNC: 0.3 MG/DL (ref 0–1.2)
BUN SERPL-MCNC: 17 MG/DL (ref 6–20)
BUN/CREAT SERPL: 21.8 (ref 7–25)
CALCIUM SPEC-SCNC: 9.4 MG/DL (ref 8.6–10.5)
CHLORIDE SERPL-SCNC: 101 MMOL/L (ref 98–107)
CHOLEST SERPL-MCNC: 221 MG/DL (ref 0–200)
CO2 SERPL-SCNC: 21.9 MMOL/L (ref 22–29)
CREAT SERPL-MCNC: 0.78 MG/DL (ref 0.57–1)
DEPRECATED RDW RBC AUTO: 44.2 FL (ref 37–54)
EGFRCR SERPLBLD CKD-EPI 2021: 104.9 ML/MIN/1.73
ERYTHROCYTE [DISTWIDTH] IN BLOOD BY AUTOMATED COUNT: 13.1 % (ref 12.3–15.4)
GLOBULIN UR ELPH-MCNC: 4.2 GM/DL
GLUCOSE SERPL-MCNC: 82 MG/DL (ref 65–99)
HCT VFR BLD AUTO: 40.2 % (ref 34–46.6)
HDLC SERPL-MCNC: 67 MG/DL (ref 40–60)
HGB BLD-MCNC: 13 G/DL (ref 12–15.9)
LDLC SERPL CALC-MCNC: 120 MG/DL (ref 0–100)
LDLC/HDLC SERPL: 1.71 {RATIO}
MCH RBC QN AUTO: 29.8 PG (ref 26.6–33)
MCHC RBC AUTO-ENTMCNC: 32.3 G/DL (ref 31.5–35.7)
MCV RBC AUTO: 92.2 FL (ref 79–97)
PLATELET # BLD AUTO: 280 10*3/MM3 (ref 140–450)
PMV BLD AUTO: 10.6 FL (ref 6–12)
POTASSIUM SERPL-SCNC: 4.6 MMOL/L (ref 3.5–5.2)
PROT SERPL-MCNC: 7.8 G/DL (ref 6–8.5)
RBC # BLD AUTO: 4.36 10*6/MM3 (ref 3.77–5.28)
SODIUM SERPL-SCNC: 136 MMOL/L (ref 136–145)
TRIGL SERPL-MCNC: 198 MG/DL (ref 0–150)
TSH SERPL DL<=0.05 MIU/L-ACNC: 3.22 UIU/ML (ref 0.27–4.2)
VLDLC SERPL-MCNC: 34 MG/DL (ref 5–40)
WBC NRBC COR # BLD AUTO: 9.04 10*3/MM3 (ref 3.4–10.8)

## 2024-11-04 PROCEDURE — 99395 PREV VISIT EST AGE 18-39: CPT | Performed by: FAMILY MEDICINE

## 2024-11-04 PROCEDURE — 80061 LIPID PANEL: CPT

## 2024-11-04 PROCEDURE — 80050 GENERAL HEALTH PANEL: CPT

## 2024-11-04 RX ORDER — LISINOPRIL 10 MG/1
10 TABLET ORAL DAILY
Qty: 90 TABLET | Refills: 3 | Status: SHIPPED | OUTPATIENT
Start: 2024-11-04

## 2024-11-04 RX ORDER — ESCITALOPRAM OXALATE 20 MG/1
20 TABLET ORAL DAILY
Qty: 90 TABLET | Refills: 1 | Status: SHIPPED | OUTPATIENT
Start: 2024-11-04

## 2024-11-04 NOTE — PROGRESS NOTES
Female Physical Note      Date: 2024   Patient Name: Kenya Nicholson  : 1995   MRN: [id number]     Chief Complaint:    Chief Complaint   Patient presents with    Annual Exam       History of Present Illness: Kenya Nicholson is a 29 y.o. female who is here today for their annual health maintenance and physical.   She is originally from [place].  She can lives at home with her  and 3-year-old son.  She works for the [employer] as a      Patient notes that since starting her lisinopril her blood pressures have been very well-controlled.  She is tolerating the medication well.  Due for refills today.  Last visit patient was initiated on Lexapro to help with anxiety and depression.  She notes that although she has been on 10 mg for 4 weeks she feels little effect in the medicine.  She would not be interested in increasing her dose today.     Patient notes that she be interested in trying a GLP-1.  Patient notes that with her PCOS it has been difficult for her to lose weight that lifestyle modification.  She notes that she has a family history of hyperlipidemia and is concerned that her triglycerides have remained elevated.  She is due for follow blood work today.     Patient is a G1, P1 with  delivery.  She has a history of preeclampsia.  Her last Pap smear was in August with Dr. Lozano at [facility] and was reportedly normal.  She does have a family history of uterine cancer.      Subjective      Review of Systems:   Review of Systems   Constitutional:  Positive for unexpected weight gain.   All other systems reviewed and are negative.      Past Medical History, Social History, Family History and Care Team were all reviewed with patient and updated as appropriate.     Medications:     Current Outpatient Medications:     Drospirenone-Ethinyl Estradiol (SINAN PO), Take  by mouth., Disp: , Rfl:     lisinopril (PRINIVIL,ZESTRIL) 10 MG tablet, Take 1 tablet by mouth Daily., Disp: 90  "tablet, Rfl: 3    triamcinolone (KENALOG) 0.1 % ointment, Apply 1 Application topically to the appropriate area as directed 2 (Two) Times a Day., Disp: 453.6 g, Rfl: 5    escitalopram (Lexapro) 20 MG tablet, Take 1 tablet by mouth Daily., Disp: 90 tablet, Rfl: 1    Semaglutide-Weight Management 0.25 MG/0.5ML solution auto-injector, Inject 0.5 mL under the skin into the appropriate area as directed 1 (One) Time Per Week., Disp: 2 mL, Rfl: 5    Allergies:   Allergies   Allergen Reactions    Minocycline Other (See Comments)     Hydrocephalus as child    Red Dye Other (See Comments)     Severe vomiting       Immunizations:  Td/Tdap(Booster Q 10 yrs): Up-to-date  Flu (Yearly): Up-to-date  Colorectal Screening:     Last Completed Colonoscopy       This patient has no relevant Health Maintenance data.           Pap:    Last Completed Pap Smear            PAP SMEAR (Every 3 Years) Next due on 8/1/2027 08/01/2024  Patient-Reported (Performed Externally)                   Mammogram:    Last Completed Mammogram       This patient has no relevant Health Maintenance data.                 PHQ-2 Depression Screening  Little interest or pleasure in doing things?     Feeling down, depressed, or hopeless?     PHQ-2 Total Score         Objective     Physical Exam:  Vital Signs:   Vitals:    11/04/24 0922   BP: 124/82   Pulse: 81   SpO2: 96%   Weight: 122 kg (268 lb 3.2 oz)   Height: 167.6 cm (65.98\")            Physical Exam  Vitals and nursing note reviewed.   Constitutional:       Appearance: Normal appearance. She is obese.   HENT:      Head: Normocephalic and atraumatic.      Nose: Nose normal.      Mouth/Throat:      Mouth: Mucous membranes are moist.   Eyes:      Extraocular Movements: Extraocular movements intact.      Pupils: Pupils are equal, round, and reactive to light.   Cardiovascular:      Rate and Rhythm: Normal rate.   Pulmonary:      Effort: Pulmonary effort is normal.   Abdominal:      General: Abdomen is flat. "   Musculoskeletal:         General: Normal range of motion.      Cervical back: Normal range of motion.   Skin:     General: Skin is warm.   Neurological:      General: No focal deficit present.      Mental Status: She is alert and oriented to person, place, and time.   Psychiatric:         Mood and Affect: Mood normal.         Behavior: Behavior normal.         Thought Content: Thought content normal.         Judgment: Judgment normal.         Procedures    Assessment / Plan      Assessment/Plan:   Diagnoses and all orders for this visit:    1. Annual physical exam (Primary)  Assessment & Plan:  Overall doing well.  Preventative screening discussed.  Immunizations reviewed and up-to-date.  Diet and exercise recommendations given.    Orders:  -     CBC (No Diff); Future  -     Lipid Panel; Future  -     Comprehensive Metabolic Panel; Future  -     TSH; Future    2. Class 3 severe obesity due to excess calories without serious comorbidity with body mass index (BMI) of 40.0 to 44.9 in adult  Assessment & Plan:  Patient's (Body mass index is 43.31 kg/m².) indicates that they are morbidly/severely obese (BMI > 40 or > 35 with obesity - related health condition) with health conditions that include impaired fasting glucose and dyslipidemias . Weight is worsening. BMI  is above average; BMI management plan is completed. We discussed portion control, increasing exercise, and pharmacologic options including GLP-1 .     Orders:  -     Semaglutide-Weight Management 0.25 MG/0.5ML solution auto-injector; Inject 0.5 mL under the skin into the appropriate area as directed 1 (One) Time Per Week.  Dispense: 2 mL; Refill: 5    3. Depression with anxiety  Assessment & Plan:  Patient's depression is a recurrent episode that is mild without psychosis. Depression is active and stable.    Plan:   Medication  dose was adjusted;  Lexapro    Followup in 3 months.     Orders:  -     escitalopram (Lexapro) 20 MG tablet; Take 1 tablet by mouth  Daily.  Dispense: 90 tablet; Refill: 1    4. Primary hypertension  Assessment & Plan:  Hypertension is stable and controlled  Continue current treatment regimen.  Weight loss.  Regular aerobic exercise.  Blood pressure will be reassessed in 3 months.    Orders:  -     lisinopril (PRINIVIL,ZESTRIL) 10 MG tablet; Take 1 tablet by mouth Daily.  Dispense: 90 tablet; Refill: 3        Follow Up:   Return in about 3 months (around 2/4/2025) for Recheck.    Healthcare Maintenance:   Counseling provided on immunizations, preventative screenings, diet and exercise recommendations.   Kenya Nicholson voices understanding and acceptance of this advice and will call back with any further questions or concerns. AVS with preventive healthcare tips printed for patient.     Kacey Chase DO   [facility] [place] [address]

## 2024-11-04 NOTE — ASSESSMENT & PLAN NOTE
Patient's (Body mass index is 43.31 kg/m².) indicates that they are morbidly/severely obese (BMI > 40 or > 35 with obesity - related health condition) with health conditions that include impaired fasting glucose and dyslipidemias . Weight is worsening. BMI  is above average; BMI management plan is completed. We discussed portion control, increasing exercise, and pharmacologic options including GLP-1 .

## 2024-11-04 NOTE — ASSESSMENT & PLAN NOTE
Patient's depression is a recurrent episode that is mild without psychosis. Depression is active and stable.    Plan:   Medication  dose was adjusted;  Lexapro    Followup in 3 months.

## 2024-11-04 NOTE — ASSESSMENT & PLAN NOTE
Overall doing well.  Preventative screening discussed.  Immunizations reviewed and up-to-date.  Diet and exercise recommendations given.

## 2024-11-05 ENCOUNTER — PRIOR AUTHORIZATION (OUTPATIENT)
Dept: FAMILY MEDICINE CLINIC | Facility: CLINIC | Age: 29
End: 2024-11-05
Payer: COMMERCIAL

## 2024-11-05 NOTE — TELEPHONE ENCOUNTER
(Key: [id number])    Wegovy 0.25MG/0.5ML auto-injectors  Form  [employer] Electronic PA Form (2017 NCPDP)

## 2025-01-20 ENCOUNTER — OFFICE VISIT (OUTPATIENT)
Age: 30
End: 2025-01-20
Payer: COMMERCIAL

## 2025-01-20 DIAGNOSIS — F42.9 OBSESSIVE-COMPULSIVE DISORDER, UNSPECIFIED TYPE: ICD-10-CM

## 2025-01-20 DIAGNOSIS — F41.1 GAD (GENERALIZED ANXIETY DISORDER): ICD-10-CM

## 2025-01-20 DIAGNOSIS — F43.10 POST TRAUMATIC STRESS DISORDER (PTSD): Primary | ICD-10-CM

## 2025-01-20 PROCEDURE — 90791 PSYCH DIAGNOSTIC EVALUATION: CPT

## 2025-01-20 NOTE — PROGRESS NOTES
Initial Assessment Note   Date: 01/20/2025   Client Name: Kenya Nicholson  MRN: [id number]  Start Time: 8:59 AM end Time: 9:50 AM    Diagnoses and all orders for this visit:    1. Post traumatic stress disorder (PTSD) (Primary)    2. JENNIFER (generalized anxiety disorder)    3. Obsessive-compulsive disorder, unspecified type         Active Symptoms/Chief Complainant:   Anxiety, depressed mood, and intrusive thoughts associated with key moments of trauma    Reported History     Hx of Presenting problem:   Client reported seeking services today due to due to having a history of therapeutic treatment and knowing that it is helpful.  Client reported moving back to [place] after college and having a traumatic experience at her first job having a lot of stress and being triggered.  Client reported at 14 she was diagnosed with OCD.  Client provided history of OCD type triggers as well as triggers to feelings of anxiety, depression, and rapid and intrusive thoughts.  Client also provided family history and all the ways that she applies expectations to her thoughts feelings and behaviors.  Client reported a history of trauma to include childhood trauma, the traumatic experience with her first job after college, and nearly dying and losing her child during childbirth.  Client denies any history of SI self-harm.  Client denies any history of HI or physically aggressive behaviors.  Client denies a history of mental health hospitalizations.  Client is currently employed and is not enrolled in any education or vocation program at this time.  Client reported having a strong support system.       Trauma Assessment:   Client reported a HX of trauma, which includes loss of safety security, almost dying and losing her child in childbirth, almost losing her sister due to an eating disorder when in middle school, and a number of incidents surrounding death and dying.    Symptoms associated with experienced trauma:     Fear, Constant  state of alertness, Powerlessness, Loss of control, Abandonment, Sleep disturbance, Nightmares, Flashbacks, Intrusive thoughts or Memories, Avoidence, Poor Concentration or Memory, Disassociation, and Loss of trust    Summary:   Client reported the above symptoms for well over 2 years    Clinician will utilize and trauma focused modality to aid the Client in their treatment.           Family HX of Mental Health Conditions:   Client reported sister anxiety and an eating disorder, paternal cousin bipolar disorder, and mother suspected anxiety    Previous Treatment HX/MH Hospitalizations:   Client reported therapeutic interventions childhood and threw it out adulthood no mental health hospitalizations       PHQ-9  Little interest or pleasure in doing things? Several days   Feeling down, depressed, or hopeless? Several days   PHQ-2 Total Score 2   Trouble falling or staying asleep, or sleeping too much? Not at all   Feeling tired or having little energy? Not at all   Poor appetite or overeating? Not at all   Feeling bad about yourself - or that you are a failure or have let yourself or your family down? Over half   Trouble concentrating on things, such as reading the newspaper or watching television? Over half   Moving or speaking so slowly that other people could have noticed? Or the opposite - being so fidgety or restless that you have been moving around a lot more than usual? Not at all   Thoughts that you would be better off dead, or of hurting yourself in some way? Not at all   PHQ-9 Total Score 6   If you checked off any problems, how difficult have these problems made it for you to do your work, take care of things at home, or get along with other people? Somewhat difficult         JENNIFER-7  JENNIFER 7 Total Score: 7         Mental Status Exam  Appearanceclean and casually dressed, appropriate  Attitude toward clinician:  cooperative and agreeable   Speech:    Rate:  regular rate and rhythm   Volume:  normal  Affect:   "anxious  Thought Processes:  linear, logical, and goal directed  Thought Content:  normal  Suicidal Thoughts:  absent  Homicidal Thoughts:  absent  Perceptual Disturbance: no perceptual disturbance  Attention and Concentration:  good  Insight and Judgement:  good  Memory:  memory appears to be intact       Support System and Protective Factors     Client reported having the following support system:   Family, friends, and peers    Client described their interactions with their support system as frequently positive    Does Client have a responsibility to care for children or pets at this time?   Full-time responsibility to care for her child and pet dog    Level of Client's current coping skills:   Client has some coping Skills.    Does Client have plans for the future that extend beyond one week?   Yes    Can Client provide a reason for living?   Yes, \"my son and I am supposed to be here.\"    Does Client feel like their life has a purpose?   Yes    Does Client feel safe in their living environment?     Client reported they feel safe stable and secure       Short Term goal for treatment:   “ To increase the amount of time in a day where I am not stuck in compulsive obsessive thoughts.”     Long Term goal for treatment:   “ Be able to do \"normal\" things like eat after my child.”     Services Client is Seeking:  Client is receiving medication management through her PCP here at [facility] medications are in the chart.  Client is seeking psychotherapy with this clinician     [place] Suicide Severity Rating (C-SSRS)  In the past month, have you wished you were dead or wished you could go to sleep and not wake up? No     In the past month, have you actually had any thoughts of killing yourself? No     Have you been thinking about how you might do this?       Have you had these thoughts and had some intention of acting on them?       Have you started to work out or have you worked out the details of how to kill yourself?     "   Have you ever in your lifetime done anything, started to do anything, or prepared to do anything to end your life? No       Was this within the past three months?       Level of Risk per Screen No Risk Indicated        C.A.G.E.  C: Have you ever felt like he needed to cut down on your drinking or drug use?  No   A: Have people annoyed you by criticizing your drinking or drug use?  No   G: Have you ever felt bad or guilty about your drinking or drug use? No   E: Have you ever had a drink first thing in the morning to steady her nerves or to get rid of a hangover? No       Risk of Harm to Self:   Low    Client reported no history of SI or self-harm    Does Client currently have or has ever had a HX of HI/Desire to inflict serious injury onto another/Physically Aggressive BX/Verbally aggressive BX?   None    Risk of Harm to Others:   Low    Client reported no history of HI or physically aggressive behavior       Initial Session Narrative     Clinical Formulation  Client reported seeking services today due to overwhelming feelings of anxiety, depressed mood, and intrusive thoughts associated with key moments of trauma.  Client also has obsessive compulsive tendencies to meticulously control small details of things in her control.    Client denies to having a HX of SI, HI, substance misuse, aggressive physical behaviors, or psychiatric hospitalizations.     Client reported they live , 3-year-old child, and dog    Client's current coping skills consist of breathing exercises, distractions, personal timeouts, work [employer] through her [place]    Per Client's reports, Client meets the criteria for PTSD, generalized anxiety disorder, and obsessive-compulsive disorder    ?   Initial intervention/Client Response:   Clinician met with Client in person at [facility].     Clinician completed initial assessment, [place] Suicide Related Assessment, safety plan, CAGE addiction assessment, PHQ-9, JENNIFER 7, trauma  assessment, and explored the Client's protective factors and strengthens.     Clinician explained permission to treat, confidentiality, the limitations of confidentiality, and discussed NO SHOW policy.     Clinician utilized the MI technique of active listening and open ended questions, to gather information, provide validation, assess barriers/readiness for change, and build rapport.     Clinician provided the Client with information on DX and possible treatment methods i.e., CBT/DBT/SFT/EMDR.    Clinician provided psychoeducation to the client on the use of the EMDR and the client agreed to the use of EMDR in session.    Client was receptive throughout the session and agreed to work with this Clinician to obtain their treatment goals.     Follow-up:    Clinician plans to complete any outstanding assessments needed for treatment and complete the Client's Care/Treatment Plan with the client during the next session.      Employment: currently employed    Education: As the client currently enrolled or attending an education or vocation program?no    Arrests in past 30 days? 0    Kvng Rondon LCSW  [facility] [id number]

## 2025-01-20 NOTE — PATIENT INSTRUCTIONS
"Client will begin to utilize the DBT technique of AAA (awareness, acceptance, and action) by practicing the identification of all 3 phases of the cognitive triangle (thought, emotion, and behavior) and challenging hurtful or negative thoughts in writing or out loud for discussion during the next session.    Follow-up:     Client agrees to keep all follow-up appointments with [facility].       Resources:     Contact Office at [phone], [phone], Text \"HOME\" to [phone], and/or go to the nearest Hospital/ER.     "

## 2025-01-23 ENCOUNTER — HOSPITAL ENCOUNTER (EMERGENCY)
Facility: HOSPITAL | Age: 30
Discharge: HOME | End: 2025-01-23
Attending: EMERGENCY MEDICINE
Payer: COMMERCIAL

## 2025-01-23 ENCOUNTER — APPOINTMENT (OUTPATIENT)
Dept: CT IMAGING | Facility: HOSPITAL | Age: 30
End: 2025-01-23
Payer: COMMERCIAL

## 2025-01-23 VITALS
SYSTOLIC BLOOD PRESSURE: 145 MMHG | BODY MASS INDEX: 39.99 KG/M2 | OXYGEN SATURATION: 98 % | RESPIRATION RATE: 16 BRPM | HEIGHT: 65 IN | HEART RATE: 81 BPM | TEMPERATURE: 98.7 F | DIASTOLIC BLOOD PRESSURE: 79 MMHG | WEIGHT: 240 LBS

## 2025-01-23 DIAGNOSIS — A08.4 VIRAL GASTROENTERITIS: Primary | ICD-10-CM

## 2025-01-23 DIAGNOSIS — R10.9 ABDOMINAL CRAMPING: ICD-10-CM

## 2025-01-23 DIAGNOSIS — R19.7 DIARRHEA, UNSPECIFIED TYPE: ICD-10-CM

## 2025-01-23 LAB
ALBUMIN SERPL-MCNC: 3.8 G/DL (ref 3.5–5.2)
ALBUMIN/GLOB SERPL: 1 G/DL
ALP SERPL-CCNC: 75 U/L (ref 39–117)
ALT SERPL W P-5'-P-CCNC: 15 U/L (ref 1–33)
ANION GAP SERPL CALCULATED.3IONS-SCNC: 11 MMOL/L (ref 5–15)
AST SERPL-CCNC: 16 U/L (ref 1–32)
B-HCG UR QL: NEGATIVE
BACTERIA UR QL AUTO: ABNORMAL /HPF
BASOPHILS # BLD AUTO: 0.04 10*3/MM3 (ref 0–0.2)
BASOPHILS NFR BLD AUTO: 0.5 % (ref 0–1.5)
BILIRUB SERPL-MCNC: 0.2 MG/DL (ref 0–1.2)
BILIRUB UR QL STRIP: NEGATIVE
BUN SERPL-MCNC: 19 MG/DL (ref 6–20)
BUN/CREAT SERPL: 31.1 (ref 7–25)
CALCIUM SPEC-SCNC: 9.3 MG/DL (ref 8.6–10.5)
CHLORIDE SERPL-SCNC: 101 MMOL/L (ref 98–107)
CLARITY UR: CLEAR
CO2 SERPL-SCNC: 24 MMOL/L (ref 22–29)
COLOR UR: YELLOW
CREAT SERPL-MCNC: 0.61 MG/DL (ref 0.57–1)
DEPRECATED RDW RBC AUTO: 45 FL (ref 37–54)
EGFRCR SERPLBLD CKD-EPI 2021: 123.5 ML/MIN/1.73
EOSINOPHIL # BLD AUTO: 0.07 10*3/MM3 (ref 0–0.4)
EOSINOPHIL NFR BLD AUTO: 0.9 % (ref 0.3–6.2)
ERYTHROCYTE [DISTWIDTH] IN BLOOD BY AUTOMATED COUNT: 13.2 % (ref 12.3–15.4)
EXPIRATION DATE: NORMAL
GLOBULIN UR ELPH-MCNC: 3.7 GM/DL
GLUCOSE SERPL-MCNC: 88 MG/DL (ref 65–99)
GLUCOSE UR STRIP-MCNC: NEGATIVE MG/DL
HCT VFR BLD AUTO: 37.8 % (ref 34–46.6)
HGB BLD-MCNC: 12.6 G/DL (ref 12–15.9)
HGB UR QL STRIP.AUTO: NEGATIVE
HYALINE CASTS UR QL AUTO: ABNORMAL /LPF
IMM GRANULOCYTES # BLD AUTO: 0.01 10*3/MM3 (ref 0–0.05)
IMM GRANULOCYTES NFR BLD AUTO: 0.1 % (ref 0–0.5)
INTERNAL NEGATIVE CONTROL: NORMAL
INTERNAL POSITIVE CONTROL: NORMAL
KETONES UR QL STRIP: NEGATIVE
LEUKOCYTE ESTERASE UR QL STRIP.AUTO: ABNORMAL
LYMPHOCYTES # BLD AUTO: 3.44 10*3/MM3 (ref 0.7–3.1)
LYMPHOCYTES NFR BLD AUTO: 45.8 % (ref 19.6–45.3)
Lab: NORMAL
MCH RBC QN AUTO: 30.9 PG (ref 26.6–33)
MCHC RBC AUTO-ENTMCNC: 33.3 G/DL (ref 31.5–35.7)
MCV RBC AUTO: 92.6 FL (ref 79–97)
MONOCYTES # BLD AUTO: 0.45 10*3/MM3 (ref 0.1–0.9)
MONOCYTES NFR BLD AUTO: 6 % (ref 5–12)
NEUTROPHILS NFR BLD AUTO: 3.5 10*3/MM3 (ref 1.7–7)
NEUTROPHILS NFR BLD AUTO: 46.7 % (ref 42.7–76)
NITRITE UR QL STRIP: NEGATIVE
NRBC BLD AUTO-RTO: 0 /100 WBC (ref 0–0.2)
PH UR STRIP.AUTO: 6.5 [PH] (ref 5–8)
PLATELET # BLD AUTO: 253 10*3/MM3 (ref 140–450)
PMV BLD AUTO: 9.9 FL (ref 6–12)
POTASSIUM SERPL-SCNC: 3.8 MMOL/L (ref 3.5–5.2)
PROT SERPL-MCNC: 7.5 G/DL (ref 6–8.5)
PROT UR QL STRIP: NEGATIVE
RBC # BLD AUTO: 4.08 10*6/MM3 (ref 3.77–5.28)
RBC # UR STRIP: ABNORMAL /HPF
REF LAB TEST METHOD: ABNORMAL
SODIUM SERPL-SCNC: 136 MMOL/L (ref 136–145)
SP GR UR STRIP: 1.01 (ref 1–1.03)
SQUAMOUS #/AREA URNS HPF: ABNORMAL /HPF
UROBILINOGEN UR QL STRIP: ABNORMAL
WBC # UR STRIP: ABNORMAL /HPF
WBC NRBC COR # BLD AUTO: 7.51 10*3/MM3 (ref 3.4–10.8)

## 2025-01-23 PROCEDURE — 74177 CT ABD & PELVIS W/CONTRAST: CPT

## 2025-01-23 PROCEDURE — 99285 EMERGENCY DEPT VISIT HI MDM: CPT

## 2025-01-23 PROCEDURE — 81025 URINE PREGNANCY TEST: CPT | Performed by: PHYSICIAN ASSISTANT

## 2025-01-23 PROCEDURE — 85025 COMPLETE CBC W/AUTO DIFF WBC: CPT | Performed by: PHYSICIAN ASSISTANT

## 2025-01-23 PROCEDURE — 87086 URINE CULTURE/COLONY COUNT: CPT | Performed by: PHYSICIAN ASSISTANT

## 2025-01-23 PROCEDURE — 80053 COMPREHEN METABOLIC PANEL: CPT | Performed by: PHYSICIAN ASSISTANT

## 2025-01-23 PROCEDURE — 81001 URINALYSIS AUTO W/SCOPE: CPT | Performed by: PHYSICIAN ASSISTANT

## 2025-01-23 PROCEDURE — 25510000001 IOPAMIDOL 61 % SOLUTION: Performed by: EMERGENCY MEDICINE

## 2025-01-23 RX ORDER — IOPAMIDOL 612 MG/ML
100 INJECTION, SOLUTION INTRAVASCULAR
Status: COMPLETED | OUTPATIENT
Start: 2025-01-23 | End: 2025-01-23

## 2025-01-23 RX ORDER — DICYCLOMINE HCL 20 MG
20 TABLET ORAL EVERY 6 HOURS
Qty: 12 TABLET | Refills: 0 | Status: SHIPPED | OUTPATIENT
Start: 2025-01-23

## 2025-01-23 RX ORDER — SODIUM CHLORIDE 0.9 % (FLUSH) 0.9 %
10 SYRINGE (ML) INJECTION AS NEEDED
Status: DISCONTINUED | OUTPATIENT
Start: 2025-01-23 | End: 2025-01-23 | Stop reason: HOSPADM

## 2025-01-23 RX ADMIN — IOPAMIDOL 85 ML: 612 INJECTION, SOLUTION INTRAVENOUS at 17:49

## 2025-01-23 NOTE — ED PROVIDER NOTES
Subjective   History of Present Illness  29-year-old female presents emergency department today with abdominal pain and cramping associate with diarrhea.  Patient reports for the past couple days been having an episode of diarrhea a day.  She had any blood in her stool no black tarry stools she has not eaten anything that she suspects to be contaminated.  She has had a little bit of nausea but no vomiting.  She been having pain in the right lower quadrant.  She states that she had a CT scan of her abdomen pelvis previously they said that her appendix was the upper ends of normal and had some fluid in it but they sent her home and she never any further problems.  She states that she has had no dysuria frequency urgency or hematuria really no other complaints.    History provided by:  Patient   used: No    Abdominal Pain  Pain location:  RLQ  Pain quality: cramping    Pain radiates to:  Does not radiate  Pain severity:  Moderate  Onset quality:  Gradual  Duration:  2 days  Timing:  Constant  Progression:  Waxing and waning  Chronicity:  New  Context: not alcohol use, not diet changes, not eating, not previous surgeries, not recent illness, not recent travel, not sick contacts, not suspicious food intake and not trauma    Relieved by:  Not moving  Worsened by:  Movement and palpation  Ineffective treatments:  None tried  Associated symptoms: diarrhea and nausea    Associated symptoms: no anorexia, no chest pain, no constipation, no dysuria, no hematemesis, no hematochezia, no hematuria, no shortness of breath and no vomiting    Risk factors: no alcohol abuse, has not had multiple surgeries, no NSAID use and not pregnant        Review of Systems   Respiratory:  Negative for chest tightness, shortness of breath and wheezing.    Cardiovascular:  Negative for chest pain.   Gastrointestinal:  Positive for abdominal pain, diarrhea and nausea. Negative for anorexia, constipation, hematemesis, hematochezia  and vomiting.   Genitourinary:  Negative for dysuria, frequency, hematuria and urgency.       No past medical history on file.    Allergies   Allergen Reactions   • Minocycline Other (See Comments)     Hydrocephalus as child   • Red Dye Other (See Comments)     Severe vomiting       Past Surgical History:   Procedure Laterality Date   • ANKLE SURGERY Right    •  SECTION     • OVARY SURGERY Left        No family history on file.    Social History     Socioeconomic History   • Marital status:    Tobacco Use   • Smoking status: Never   • Smokeless tobacco: Never   Vaping Use   • Vaping status: Never Used   Substance and Sexual Activity   • Alcohol use: Never   • Drug use: Never           Objective   Physical Exam  Vitals and nursing note reviewed.   Constitutional:       General: She is not in acute distress.     Appearance: She is well-developed. She is not diaphoretic.   HENT:      Head: Normocephalic and atraumatic.      Nose: Nose normal.   Eyes:      General: No scleral icterus.     Conjunctiva/sclera: Conjunctivae normal.   Cardiovascular:      Rate and Rhythm: Normal rate and regular rhythm.      Heart sounds: Normal heart sounds. No murmur heard.  Pulmonary:      Effort: Pulmonary effort is normal. No respiratory distress.      Breath sounds: Normal breath sounds.   Abdominal:      General: Bowel sounds are normal.      Palpations: Abdomen is soft.      Tenderness: There is no abdominal tenderness.   Musculoskeletal:         General: Normal range of motion.      Cervical back: Normal range of motion and neck supple.   Skin:     General: Skin is warm and dry.   Neurological:      Mental Status: She is alert and oriented to person, place, and time.   Psychiatric:         Behavior: Behavior normal.       Procedures           ED Course                                           Recent Results (from the past 24 hours)   Urinalysis With Culture If Indicated - Urine, Clean Catch    Collection Time:  01/23/25  4:54 PM    Specimen: Urine, Clean Catch   Result Value Ref Range    Color, UA Yellow Yellow, Straw    Appearance, UA Clear Clear    pH, UA 6.5 5.0 - 8.0    Specific Gravity, UA 1.013 1.001 - 1.030    Glucose, UA Negative Negative    Ketones, UA Negative Negative    Bilirubin, UA Negative Negative    Blood, UA Negative Negative    Protein, UA Negative Negative    Leuk Esterase, UA Trace (A) Negative    Nitrite, UA Negative Negative    Urobilinogen, UA 0.2 E.U./dL 0.2 - 1.0 E.U./dL   Urinalysis, Microscopic Only - Urine, Clean Catch    Collection Time: 01/23/25  4:54 PM    Specimen: Urine, Clean Catch   Result Value Ref Range    RBC, UA 0-2 None Seen, 0-2 /HPF    WBC, UA 6-10 (A) None Seen, 0-2 /HPF    Bacteria, UA None Seen None Seen, Trace /HPF    Squamous Epithelial Cells, UA 3-6 (A) None Seen, 0-2 /HPF    Hyaline Casts, UA None Seen 0 - 6 /LPF    Methodology Automated Microscopy    POC Urine Pregnancy    Collection Time: 01/23/25  5:02 PM    Specimen: Urine   Result Value Ref Range    HCG, Urine, QL Negative Negative    Lot Number [id number]     Internal Positive Control Passed Positive, Passed    Internal Negative Control Passed Negative, Passed    Expiration Date 4/22/26    Comprehensive Metabolic Panel    Collection Time: 01/23/25  5:18 PM    Specimen: Blood   Result Value Ref Range    Glucose 88 65 - 99 mg/dL    BUN 19 6 - 20 mg/dL    Creatinine 0.61 0.57 - 1.00 mg/dL    Sodium 136 136 - 145 mmol/L    Potassium 3.8 3.5 - 5.2 mmol/L    Chloride 101 98 - 107 mmol/L    CO2 24.0 22.0 - 29.0 mmol/L    Calcium 9.3 8.6 - 10.5 mg/dL    Total Protein 7.5 6.0 - 8.5 g/dL    Albumin 3.8 3.5 - 5.2 g/dL    ALT (SGPT) 15 1 - 33 U/L    AST (SGOT) 16 1 - 32 U/L    Alkaline Phosphatase 75 39 - 117 U/L    Total Bilirubin 0.2 0.0 - 1.2 mg/dL    Globulin 3.7 gm/dL    A/G Ratio 1.0 g/dL    BUN/Creatinine Ratio 31.1 (H) 7.0 - 25.0    Anion Gap 11.0 5.0 - 15.0 mmol/L    eGFR 123.5 >60.0 mL/min/1.73   CBC Auto Differential     Collection Time: 01/23/25  5:18 PM    Specimen: Blood   Result Value Ref Range    WBC 7.51 3.40 - 10.80 10*3/mm3    RBC 4.08 3.77 - 5.28 10*6/mm3    Hemoglobin 12.6 12.0 - 15.9 g/dL    Hematocrit 37.8 34.0 - 46.6 %    MCV 92.6 79.0 - 97.0 fL    MCH 30.9 26.6 - 33.0 pg    MCHC 33.3 31.5 - 35.7 g/dL    RDW 13.2 12.3 - 15.4 %    RDW-SD 45.0 37.0 - 54.0 fl    MPV 9.9 6.0 - 12.0 fL    Platelets 253 140 - 450 10*3/mm3    Neutrophil % 46.7 42.7 - 76.0 %    Lymphocyte % 45.8 (H) 19.6 - 45.3 %    Monocyte % 6.0 5.0 - 12.0 %    Eosinophil % 0.9 0.3 - 6.2 %    Basophil % 0.5 0.0 - 1.5 %    Immature Grans % 0.1 0.0 - 0.5 %    Neutrophils, Absolute 3.50 1.70 - 7.00 10*3/mm3    Lymphocytes, Absolute 3.44 (H) 0.70 - 3.10 10*3/mm3    Monocytes, Absolute 0.45 0.10 - 0.90 10*3/mm3    Eosinophils, Absolute 0.07 0.00 - 0.40 10*3/mm3    Basophils, Absolute 0.04 0.00 - 0.20 10*3/mm3    Immature Grans, Absolute 0.01 0.00 - 0.05 10*3/mm3    nRBC 0.0 0.0 - 0.2 /100 WBC     Note: In addition to lab results from this visit, the labs listed above may include labs taken at another facility or during a different encounter within the last 24 hours. Please correlate lab times with ED admission and discharge times for further clarification of the services performed during this visit.    CT Abdomen Pelvis With Contrast   Final Result   Impression:      1. No definite acute CT abnormality of the abdomen or pelvis.   2. There is a prominent appendix which measures just above the typical upper limits of normal in size. However, no periappendiceal stranding or inflammatory changes are seen. Clinical and laboratory correlation are recommended as this may be    developmental/anatomical variation. Early appendicitis is considered less likely but not entirely excluded.   3. Trace pelvic fluid is most likely physiologic.            Electronically Signed: Elana Browne MD     1/23/2025 6:08 PM EST     Workstation ID: [id number]        Vitals:    01/23/25 1631  "  BP: 136/76   BP Location: Left arm   Patient Position: Sitting   Pulse: 86   Resp: 16   Temp: 98.7 °F (37.1 °C)   TempSrc: Oral   SpO2: 100%   Weight: 109 kg (240 lb)   Height: 165.1 cm (65\")     Medications   sodium chloride 0.9 % flush 10 mL (has no administration in time range)   iopamidol (ISOVUE-300) 61 % injection 100 mL (85 mL Intravenous Given 1/23/25 1749)     ECG/EMG Results (last 24 hours)       ** No results found for the last 24 hours. **          No orders to display                   Medical Decision Making      Final diagnoses:   Viral gastroenteritis   Diarrhea, unspecified type   Abdominal cramping       ED Disposition  ED Disposition       ED Disposition   Discharge    Condition   Stable    Comment   --               Kacey Chase DO  [address] [facility] [address] [phone]               Medication List        New Prescriptions      dicyclomine 20 MG tablet  Commonly known as: BENTYL  Take 1 tablet by mouth Every 6 (Six) Hours.               Where to Get Your Medications        These medications were sent to [facility] - [place], [place] - [address] [place], [place] [address] - [phone]  - [phone] [address] [address]      Phone: [phone]   dicyclomine 20 MG tablet         " Glycopyrrolate Counseling:  I discussed with the patient the risks of glycopyrrolate including but not limited to skin rash, drowsiness, dry mouth, difficulty urinating, and blurred vision.

## 2025-01-25 LAB — BACTERIA SPEC AEROBE CULT: NO GROWTH

## 2025-01-27 ENCOUNTER — OFFICE VISIT (OUTPATIENT)
Dept: FAMILY MEDICINE CLINIC | Facility: CLINIC | Age: 30
End: 2025-01-27
Payer: COMMERCIAL

## 2025-01-27 ENCOUNTER — HOSPITAL ENCOUNTER (OUTPATIENT)
Dept: ULTRASOUND IMAGING | Facility: HOSPITAL | Age: 30
Discharge: HOME | End: 2025-01-27
Payer: COMMERCIAL

## 2025-01-27 ENCOUNTER — LAB (OUTPATIENT)
Dept: LAB | Facility: HOSPITAL | Age: 30
End: 2025-01-27
Payer: COMMERCIAL

## 2025-01-27 VITALS
WEIGHT: 244.2 LBS | HEART RATE: 76 BPM | OXYGEN SATURATION: 99 % | HEIGHT: 65 IN | SYSTOLIC BLOOD PRESSURE: 136 MMHG | DIASTOLIC BLOOD PRESSURE: 80 MMHG | BODY MASS INDEX: 40.69 KG/M2

## 2025-01-27 DIAGNOSIS — R10.31 RLQ ABDOMINAL PAIN: Primary | ICD-10-CM

## 2025-01-27 DIAGNOSIS — R10.31 RLQ ABDOMINAL PAIN: ICD-10-CM

## 2025-01-27 DIAGNOSIS — E78.5 DYSLIPIDEMIA: ICD-10-CM

## 2025-01-27 LAB
ANION GAP SERPL CALCULATED.3IONS-SCNC: 13 MMOL/L (ref 5–15)
BASOPHILS # BLD AUTO: 0.04 10*3/MM3 (ref 0–0.2)
BASOPHILS NFR BLD AUTO: 0.5 % (ref 0–1.5)
BUN SERPL-MCNC: 17 MG/DL (ref 6–20)
BUN/CREAT SERPL: 25.4 (ref 7–25)
CALCIUM SPEC-SCNC: 9.4 MG/DL (ref 8.6–10.5)
CHLORIDE SERPL-SCNC: 106 MMOL/L (ref 98–107)
CHOLEST SERPL-MCNC: 196 MG/DL (ref 0–200)
CO2 SERPL-SCNC: 20 MMOL/L (ref 22–29)
CREAT SERPL-MCNC: 0.67 MG/DL (ref 0.57–1)
CRP SERPL-MCNC: 1.88 MG/DL (ref 0–0.5)
DEPRECATED RDW RBC AUTO: 44.2 FL (ref 37–54)
EGFRCR SERPLBLD CKD-EPI 2021: 120.8 ML/MIN/1.73
EOSINOPHIL # BLD AUTO: 0.07 10*3/MM3 (ref 0–0.4)
EOSINOPHIL NFR BLD AUTO: 0.9 % (ref 0.3–6.2)
ERYTHROCYTE [DISTWIDTH] IN BLOOD BY AUTOMATED COUNT: 13.1 % (ref 12.3–15.4)
GLUCOSE SERPL-MCNC: 74 MG/DL (ref 65–99)
HCT VFR BLD AUTO: 39.5 % (ref 34–46.6)
HDLC SERPL-MCNC: 54 MG/DL (ref 40–60)
HGB BLD-MCNC: 13.6 G/DL (ref 12–15.9)
IMM GRANULOCYTES # BLD AUTO: 0.02 10*3/MM3 (ref 0–0.05)
IMM GRANULOCYTES NFR BLD AUTO: 0.3 % (ref 0–0.5)
LDLC SERPL CALC-MCNC: 102 MG/DL (ref 0–100)
LDLC/HDLC SERPL: 1.77 {RATIO}
LYMPHOCYTES # BLD AUTO: 2.35 10*3/MM3 (ref 0.7–3.1)
LYMPHOCYTES NFR BLD AUTO: 31 % (ref 19.6–45.3)
MCH RBC QN AUTO: 32.4 PG (ref 26.6–33)
MCHC RBC AUTO-ENTMCNC: 34.4 G/DL (ref 31.5–35.7)
MCV RBC AUTO: 94 FL (ref 79–97)
MONOCYTES # BLD AUTO: 0.37 10*3/MM3 (ref 0.1–0.9)
MONOCYTES NFR BLD AUTO: 4.9 % (ref 5–12)
NEUTROPHILS NFR BLD AUTO: 4.73 10*3/MM3 (ref 1.7–7)
NEUTROPHILS NFR BLD AUTO: 62.4 % (ref 42.7–76)
NRBC BLD AUTO-RTO: 0 /100 WBC (ref 0–0.2)
PLATELET # BLD AUTO: 241 10*3/MM3 (ref 140–450)
PMV BLD AUTO: 11.3 FL (ref 6–12)
POTASSIUM SERPL-SCNC: 4 MMOL/L (ref 3.5–5.2)
RBC # BLD AUTO: 4.2 10*6/MM3 (ref 3.77–5.28)
SODIUM SERPL-SCNC: 139 MMOL/L (ref 136–145)
TRIGL SERPL-MCNC: 233 MG/DL (ref 0–150)
VLDLC SERPL-MCNC: 40 MG/DL (ref 5–40)
WBC NRBC COR # BLD AUTO: 7.58 10*3/MM3 (ref 3.4–10.8)

## 2025-01-27 PROCEDURE — 80048 BASIC METABOLIC PNL TOTAL CA: CPT

## 2025-01-27 PROCEDURE — 76830 TRANSVAGINAL US NON-OB: CPT

## 2025-01-27 PROCEDURE — 85025 COMPLETE CBC W/AUTO DIFF WBC: CPT

## 2025-01-27 PROCEDURE — 99214 OFFICE O/P EST MOD 30 MIN: CPT | Performed by: FAMILY MEDICINE

## 2025-01-27 PROCEDURE — 86140 C-REACTIVE PROTEIN: CPT

## 2025-01-27 PROCEDURE — 80061 LIPID PANEL: CPT

## 2025-01-27 NOTE — PROGRESS NOTES
Office Note     Name: Kenya Nicholson    : 1995     MRN: [id number]     Chief Complaint  Hospital Follow Up Visit    Subjective     History of Present Illness:  Kenya Nicholson is a 29 y.o. female who presents today for ER FU.  She is originally from [place].  She can lives at home with her  and 3-year-old son.  She works for the [employer] as a     Patient presents today to follow-up on her ER visit.  Patient presented to the ER on  with acute onset right lower quadrant pain.  She notes that she had eaten dinner with her  and shortly after had a loose bowel movement.  Following that she noticed a severe stabbing right lower quadrant pain.  She notes that the pain was persistent and it made it difficult for her to stand straight and bend certain directions.  She notes that she does have a history of a left ovarian teratoma and the pain was similar to that, however on the opposite side.  She notes that she did go to the ER and underwent laboratory evaluation that was unremarkable and a CT showed an enlarged appendix but no infection.  She has reach out to her gynecologist and is awaiting a response.  She also since that time she has had some constipation, and on the CT did note a large stool burden.  She notes she has started fiber Gummies in addition to her regular MiraLAX, and now her bowel movements have regulated.  She notes that her pain is a 0 at rest, however she is tender to palpation in the right lower quadrant during exam.  CT also showed free fluid in the pelvis.  She notes she is due to have her menstrual cycle this Friday.    Patient is a G1, P1 with  delivery.  She has a history of preeclampsia.  Her last Pap smear was in August with Dr. Lozano at [facility] and was reportedly normal.  She does have a family history of uterine cancer.      Review of Systems:   Review of Systems   Gastrointestinal:  Positive for abdominal pain.   All other systems  reviewed and are negative.      Past Medical History: History reviewed. No pertinent past medical history.    Past Surgical History:   Past Surgical History:   Procedure Laterality Date    ANKLE SURGERY Right      SECTION      OVARY SURGERY Left        Family History: History reviewed. No pertinent family history.    Social History:   Social History     Socioeconomic History    Marital status:    Tobacco Use    Smoking status: Never    Smokeless tobacco: Never   Vaping Use    Vaping status: Never Used   Substance and Sexual Activity    Alcohol use: Never    Drug use: Never       Immunizations:   Immunization History   Administered Date(s) Administered    COVID-19 (PFIZER) Purple Cap Monovalent 2021, 2021    Covid-19 (Pfizer) Gray Cap Monovalent 2022    Flu Vaccine Quad PF 6-35MO 2021    Fluzone  >6mos 10/04/2024    Influenza, Unspecified 2019, 10/18/2019, 10/24/2020    Tdap 2019, 2021        Medications:     Current Outpatient Medications:     dicyclomine (BENTYL) 20 MG tablet, Take 1 tablet by mouth Every 6 (Six) Hours., Disp: 12 tablet, Rfl: 0    Drospirenone-Ethinyl Estradiol (SINAN PO), Take  by mouth., Disp: , Rfl:     escitalopram (Lexapro) 20 MG tablet, Take 1 tablet by mouth Daily., Disp: 90 tablet, Rfl: 1    lisinopril (PRINIVIL,ZESTRIL) 10 MG tablet, Take 1 tablet by mouth Daily., Disp: 90 tablet, Rfl: 3    Semaglutide-Weight Management 0.25 MG/0.5ML solution auto-injector, Inject 0.5 mL under the skin into the appropriate area as directed 1 (One) Time Per Week., Disp: 2 mL, Rfl: 5    triamcinolone (KENALOG) 0.1 % ointment, Apply 1 Application topically to the appropriate area as directed 2 (Two) Times a Day., Disp: 453.6 g, Rfl: 5    Allergies:   Allergies   Allergen Reactions    Minocycline Other (See Comments)     Hydrocephalus as child    Red Dye Other (See Comments)     Severe vomiting       Objective     Vital Signs  /80   Pulse 76   Ht  "165.1 cm (65\")   Wt 111 kg (244 lb 3.2 oz)   SpO2 99%   BMI 40.64 kg/m²   Estimated body mass index is 40.64 kg/m² as calculated from the following:    Height as of this encounter: 165.1 cm (65\").    Weight as of this encounter: 111 kg (244 lb 3.2 oz).         Physical Exam  Vitals and nursing note reviewed.   Constitutional:       Appearance: Normal appearance. She is normal weight.   HENT:      Head: Normocephalic and atraumatic.      Nose: Nose normal.      Mouth/Throat:      Mouth: Mucous membranes are moist.   Eyes:      Extraocular Movements: Extraocular movements intact.      Pupils: Pupils are equal, round, and reactive to light.   Cardiovascular:      Rate and Rhythm: Normal rate.   Pulmonary:      Effort: Pulmonary effort is normal.   Abdominal:      General: Abdomen is flat.      Tenderness: There is abdominal tenderness in the right lower quadrant. There is no guarding or rebound.   Musculoskeletal:         General: Normal range of motion.      Cervical back: Normal range of motion.   Skin:     General: Skin is warm.   Neurological:      General: No focal deficit present.      Mental Status: She is alert and oriented to person, place, and time.   Psychiatric:         Mood and Affect: Mood normal.         Behavior: Behavior normal.         Thought Content: Thought content normal.         Judgment: Judgment normal.            Procedures     Results:  No results found for this or any previous visit (from the past 24 hours).     Assessment and Plan     Assessment/Plan:  Diagnoses and all orders for this visit:    1. RLQ abdominal pain (Primary)  Assessment & Plan:  Previous lab work and CT reviewed.  Due to persistent tenderness on exam, will follow-up with ultrasound of the right pelvis to assess for ovarian pathology.  Will also repeat laboratory evaluation including CRP.    Orders:  -     US Non-ob Transvaginal; Future  -     CBC Auto Differential; Future  -     C-reactive Protein; Future  -     Basic " Metabolic Panel; Future    2. Dyslipidemia  Assessment & Plan:  Repeat lipid panel today    Orders:  -     Lipid Panel; Future        Follow Up  Return in about 43 weeks (around 11/24/2025) for Annual physical.    Kacey Chase DO   [facility]

## 2025-01-27 NOTE — ASSESSMENT & PLAN NOTE
Previous lab work and CT reviewed.  Due to persistent tenderness on exam, will follow-up with ultrasound of the right pelvis to assess for ovarian pathology.  Will also repeat laboratory evaluation including CRP.

## 2025-03-06 ENCOUNTER — OFFICE VISIT (OUTPATIENT)
Age: 30
End: 2025-03-06
Payer: COMMERCIAL

## 2025-03-06 DIAGNOSIS — F41.1 GAD (GENERALIZED ANXIETY DISORDER): ICD-10-CM

## 2025-03-06 DIAGNOSIS — F43.10 POST TRAUMATIC STRESS DISORDER (PTSD): Primary | ICD-10-CM

## 2025-03-06 DIAGNOSIS — F42.9 OBSESSIVE-COMPULSIVE DISORDER, UNSPECIFIED TYPE: ICD-10-CM

## 2025-03-06 NOTE — PROGRESS NOTES
"        Progress Note     Date: 03/06/2025  Client Name: Kenya Nicholson  MRN: [id number]  Start Time:    12:08 PM end Time:    1:10 PM     Diagnoses and all orders for this visit:    1. Post traumatic stress disorder (PTSD) (Primary)    2. JENNIFER (generalized anxiety disorder)    3. Obsessive-compulsive disorder, unspecified type         Active Symptoms/Chief Complainant:   Anxiety, depressed mood, and obsessive compulsive intrusive thoughts associated with key moments of trauma    MENTAL STATUS EXAM   General Appearance:  Cleanly groomed and dressed  Eye Contact:  Good eye contact  Attitude:  Cooperative  Speech:  Normal rate, tone, volume  Mood and affect:  Normal, pleasant  Thought Process:  Logical  Associations/ Thought Content:  No delusions  Suicidal Ideations:  Not present  Homicidal Ideation:  Not present  Orientation:  Person, place and time  Insight:  Good  Judgement:  Good         Care Plan:  Goal:     \" I want to be able to increase the amount of time in a day where I am not having intrusive compulsive thoughts.\"     Objective:    Over the next 90 days, I will utilize learned skills and techniques to reduce obsessive-compulsive thoughts from   daily times a week to 5 to 6 days a week, as measured by Client reports and reductions in the JENNIFER 7 overall score.       New plan of care was created and entered today with the client.  Too soon to assess any type of progress due to new plan being entered this date.      PHQ-9  6        JENNIFER 7  7    Risk to self:  Low      Risk others:  Low      Progress Note Intervention/Client Response     Clinician met with the Client at the [facility]. ?     Clinician utilized MI to assess and help the Client identify barriers for change, assess readiness for change, assist Client in processing through feelings, identify possible pathways for forward movement, gain insight, and complete the Care Plan/Treatment Plan.     Clinician provided support through active " listening, reflection, and validation.     Clinician utilized? CBT techniques of psychoeducation and reframing to leave the client through cognitive reframing exercise to aid her in shifting her perspective on some of today's discussed topics.  Clinician utilized the psychoeducation piece to discuss boundaries and to explain fight or flight triggers.    Follow-up:    Clinician plans to continue working on reframing and begin distress tolerance skills during the next session.   ?   Client response:     Client processed feelings on feeling more down and sad, concerns about current medications, increased feelings of avoidance and guilt, the stressors/triggers to these feelings of guilt and avoidance, key moments of previous traumas associated with active moments, and some new positive moments that she has been taking to feel better.    Client was open, receptive, and engaged during the session. Client took an active role in the creation of their treatment plan.  Client seemed to make connections with today's discussion and exercises.      Patient acknowledged and verbally consented to continue with treatment with this Clinician and continue working on goals outlined in the current treatment plan.      Arrests in past 30 days? 0    Attending School/Vocational Program in Past 90-days? No    Dictated using Dragon Speech Recognition.    Kvng Rondon LCSW  [facility]

## 2025-03-06 NOTE — PLAN OF CARE
"Goal:     \" I want to be able to increase the amount of time in a day where I am not having intrusive compulsive thoughts.\"     Objective:    Over the next 90 days, I will utilize learned skills and techniques to reduce obsessive-compulsive thoughts from   daily times a week to 5 to 6 days a week, as measured by Client reports and reductions in the JENNIFER 7 overall score.      "

## 2025-03-06 NOTE — PATIENT INSTRUCTIONS
"Client will continue to utilize the DBT technique of AAA (awareness, acceptance, and action) by practicing the identification of all 3 phases of the cognitive triangle (thought, emotion, and behavior) and challenging hurtful or negative thoughts in writing or out loud for discussion during the next session.    Safety Plan:    Follow-up:  Client agrees to keep all follow-up appointments with [facility] and continue using support system as needed.       Resources:     Contact Office at [phone], [phone], [phone], Text \"HOME\" to [phone], and/or go to the nearest Hospital/ER in the event of a crisis.   "

## 2025-03-06 NOTE — TREATMENT PLAN
"Multi-Disciplinary Problems (from Behavioral Health Treatment Plan)      Active Problems       Problem: Trauma and Stressor Related Disorders  Start Date: 03/06/25      Problem Details: Problem Statement:     Client is struggling with anxiety, depressed mood, and obsessive compulsive intrusive thoughts associated with key moments of trauma    Plan Discharge:     Services will be discontinued upon completion of treatment goals, 45 days without services, 3 No Shows in a 12-month period, Client report services are no longer needed, or when \"I am able to feel like him living a normal life.\"            Goal Priority Start Date Expected End Date End Date    Patient will process and move through trauma in a way that improves self regard and the patients ability to function optimally in the world around them. -- 03/06/25 09/04/25 --    Goal Details: Goal:     \" I want to be able to increase the amount of time in a day where I am not having intrusive compulsive thoughts.\"     Objective:    Over the next 90 days, I will utilize learned skills and techniques to reduce obsessive-compulsive thoughts from   daily times a week to 5 to 6 days a week, as measured by Client reports and reductions in the JENNIFER 7 overall score.              Goal Intervention Frequency Start Date End Date    Assist patient in identifying ways that trauma has negatively impacted their view of themselves and the world. Weekly 03/06/25 --    Intervention Details: Interventions:     Clinician will utilize therapeutic modalities of MI, reflective listening, play therapy, and CBT/DBT/SFT/EMDR/etc. during bi-weekly sessions to assist the Client in achieving their goals.? Clinician will measure progress and document progress through the use of Client reports in each session and within the Care Plan on a month basis.        Client will utilize strengths such as a desire for change and creativity, to practice taught skills in between sessions and complete " therapeutic assignments to aid in them is achieving their desired goals.               Goal Intervention Frequency Start Date End Date    Process trauma in the context of the safe session environment. Weekly 03/06/25 --    Intervention Details: Interventions:     Clinician will utilize therapeutic modalities of MI, reflective listening, play therapy, and CBT/DBT/SFT/EMDR/etc. during bi-weekly sessions to assist the Client in achieving their goals.? Clinician will measure progress and document progress through the use of Client reports in each session and within the Care Plan on a month basis.        Client will utilize strengths such as a desire for change and creativity, to practice taught skills in between sessions and complete therapeutic assignments to aid in them is achieving their desired goals.               Goal Intervention Frequency Start Date End Date    Develop a plan of behavior changes that will reduce the stress of the trauma. Weekly 03/06/25 --    Intervention Details: Interventions:     Clinician will utilize therapeutic modalities of MI, reflective listening, play therapy, and CBT/DBT/SFT/EMDR/etc. during bi-weekly sessions to assist the Client in achieving their goals.? Clinician will measure progress and document progress through the use of Client reports in each session and within the Care Plan on a month basis.        Client will utilize strengths such as a desire for change and creativity, to practice taught skills in between sessions and complete therapeutic assignments to aid in them is achieving their desired goals.                                      I have discussed and reviewed this treatment plan with the patient.

## 2025-03-17 ENCOUNTER — OFFICE VISIT (OUTPATIENT)
Age: 30
End: 2025-03-17
Payer: COMMERCIAL

## 2025-03-17 DIAGNOSIS — F43.10 POST TRAUMATIC STRESS DISORDER (PTSD): Primary | ICD-10-CM

## 2025-03-17 DIAGNOSIS — F42.9 OBSESSIVE-COMPULSIVE DISORDER, UNSPECIFIED TYPE: ICD-10-CM

## 2025-03-17 DIAGNOSIS — F41.1 GAD (GENERALIZED ANXIETY DISORDER): ICD-10-CM

## 2025-03-17 NOTE — PROGRESS NOTES
"        Progress Note     Date: 03/17/2025   Client Name: Kenya Nicholson   MRN: [id number]   Start Time:    9 AM end Time:    9:56 AM    Diagnoses and all orders for this visit:    1. Post traumatic stress disorder (PTSD) (Primary)    2. JENNIFER (generalized anxiety disorder)    3. Obsessive-compulsive disorder, unspecified type          Active Symptoms/Chief Complainant:   Anxiety, depressed mood, and obsessive compulsive intrusive thoughts associated with key moments of trauma    MENTAL STATUS EXAM   General Appearance:  Cleanly groomed and dressed  Eye Contact:  Good eye contact  Attitude:  Cooperative  Speech:  Normal rate, tone, volume  Mood and affect:  Normal, pleasant  Thought Process:  Logical  Associations/ Thought Content:  No delusions  Hallucinations:  None  Suicidal Ideations:  Not present  Homicidal Ideation:  Not present  Orientation:  Person, place and time  Insight:  Good  Judgement:  Good       Care Plan:  Goal:     \" I want to be able to increase the amount of time in a day where I am not having intrusive compulsive thoughts.\"     Objective:    Over the next 90 days, I will utilize learned skills and techniques to reduce obsessive-compulsive thoughts from   daily times a week to 5 to 6 days a week, as measured by Client reports and reductions in the JENNIFER 7 overall score.       Client reported making some progress on current care plan.  Ongoing treatment is still needed for this client due to goals not being fully realized or symptomology not being resolved.    Risk to self:  Low      Risk to others:  Low      Progress Note Intervention       Progress Note Intervention:     Clinician met with the Client at the [facility]. ?     Clinician utilized MI to assess and assist Client in processing through feelings, identify possible pathways for forward movement, and gain insight.     Clinician provided support through active listening, reflection, and validation.     Clinician utilized? DBT " techniques of interpersonal effectiveness and psychoeducation to discuss with the client the dialectic thinking that she is engaging in and to aid her see it from a different perspective.  Clinician then worked on the understanding of how awareness and acceptance can lead to meaningful actions.    Follow-up:    Clinician plans to work on go over assigned homework of the solution-focused therapy technique of the miracle question during the next session. ?     Client response:     Client processed feelings on the effects that recent weather changes have had on her, past moments with depressed mood in her teenage years, struggles with raising a 3-year-old child, enter personal conflicts at home, and overwhelming feelings of having to overcompensate and restrict her feelings for others.    Client was open, receptive, and engaged during the session. Client seemed to make connections with today's discussion and exercises.    Patient acknowledged and verbally consented to continue with treatment with this Clinician and continue working on goals outlined in the current treatment plan.      Dictated using Dragon Speech Recognition.    Kvng Rondon LCSW  [id number]

## 2025-03-17 NOTE — PATIENT INSTRUCTIONS
"Client can practice the utilization of the DBT technique of AAA (awareness, acceptance, and action) by practicing the identification of all 3 phases of the cognitive triangle (thought, emotion, and behavior) and challenging hurtful or negative thoughts in writing or out loud for discussion during the next session.    Safety Plan:    Follow-up:  Client agrees to keep all follow-up appointments with [facility] and continue using support system as needed.       Resources:     Contact Office at [phone], 988, 911, Text \"HOME\" to [phone], and/or go to the nearest Hospital/ER in the event of a crisis.     "

## 2025-04-07 ENCOUNTER — OFFICE VISIT (OUTPATIENT)
Age: 30
End: 2025-04-07
Payer: COMMERCIAL

## 2025-04-07 DIAGNOSIS — F43.10 POST TRAUMATIC STRESS DISORDER (PTSD): Primary | ICD-10-CM

## 2025-04-07 DIAGNOSIS — F42.9 OBSESSIVE-COMPULSIVE DISORDER, UNSPECIFIED TYPE: ICD-10-CM

## 2025-04-07 DIAGNOSIS — F41.1 GAD (GENERALIZED ANXIETY DISORDER): ICD-10-CM

## 2025-04-07 PROCEDURE — 90837 PSYTX W PT 60 MINUTES: CPT

## 2025-04-07 NOTE — PATIENT INSTRUCTIONS
"Client can practice the utilization of the DBT technique of AAA (awareness, acceptance, and action) by practicing the identification of all 3 phases of the cognitive triangle (thought, emotion, and behavior) and challenging hurtful or negative thoughts in writing or out loud for discussion during the next session.    Safety Plan:    Follow-up:  Client agrees to keep all follow-up appointments with [facility] and continue using support system as needed.       Resources:     Contact Office at [phone], 988, 911, Text \"HOME\" to [phone], and/or go to the nearest Hospital/ER in the event of a crisis.     "

## 2025-04-07 NOTE — PROGRESS NOTES
"        Progress Note     Date: 04/07/2025   Client Name: Kenya Nicholson   MRN: [id number]   Start Time:    8 AM end Time:    8:53 AM    Diagnoses and all orders for this visit:    1. Post traumatic stress disorder (PTSD) (Primary)    2. JENNIFER (generalized anxiety disorder)    3. Obsessive-compulsive disorder, unspecified type          Active Symptoms/Chief Complainant:   Anxiety, depressed mood, and obsessive compulsive intrusive thoughts associated with key moments of trauma    MENTAL STATUS EXAM   General Appearance:  Cleanly groomed and dressed  Eye Contact:  Good eye contact  Attitude:  Cooperative  Speech:  Normal rate, tone, volume  Mood and affect:  Normal, pleasant  Thought Process:  Logical  Associations/ Thought Content:  No delusions  Suicidal Ideations:  Not present  Homicidal Ideation:  Not present  Orientation:  Place, person and time  Insight:  Good  Judgement:  Good       Care Plan:  Goal:     \" I want to be able to increase the amount of time in a day where I am not having intrusive compulsive thoughts.\"     Objective:    Over the next 90 days, I will utilize learned skills and techniques to reduce obsessive-compulsive thoughts from daily times a week to 5 to 6 days a week, as measured by Client reports and reductions in the JENNIFER 7 overall score.       Client reported making some progress on current care plan.  Ongoing treatment is still needed for this client due to goals not being fully realized or symptomology not being resolved.    Risk to self:  Low      Risk to others:  Low      Progress Note Intervention       Progress Note Intervention:     Clinician met with the Client at the [facility]. ?     Clinician utilized MI to assess and assist Client in processing through feelings, identify possible pathways for forward movement, and gain insight.     Clinician provided support through active listening, reflection, and validation.     Clinician utilized? EMDR technique of teaching " farzana scratch out to aid the client in processing through feelings of anxiety prior to sleep.    Follow-up:    Clinician plans to process first key moment of trauma using EMDR during the next session. ?     Client response:     Client processed feelings on ongoing struggles with feeling increased depressed mood when at work, reduction in symptoms when at home, return of having more nightmares specifically of moments where she has no control or is trapped, recent triggers/stressors to feelings of increased anxiety, ongoing positive coping skills that seem to be working and are still aiding her in reducing signs and symptoms of OCD, and key connecting moments with past moments of trauma to include the first [place] moment of trauma that we will try to process during the next session using EMDR.    Client was open, receptive, and engaged during the session. Client seemed to make connections with today's discussion and exercises.    Patient acknowledged and verbally consented to continue with treatment with this Clinician and continue working on goals outlined in the current treatment plan.      Dictated using Dragon Speech Recognition.    Kvng Rondon LCSW  [id number]

## 2025-04-21 ENCOUNTER — OFFICE VISIT (OUTPATIENT)
Age: 30
End: 2025-04-21
Payer: COMMERCIAL

## 2025-04-21 DIAGNOSIS — F43.10 POST TRAUMATIC STRESS DISORDER (PTSD): Primary | ICD-10-CM

## 2025-04-21 DIAGNOSIS — F42.9 OBSESSIVE-COMPULSIVE DISORDER, UNSPECIFIED TYPE: ICD-10-CM

## 2025-04-21 DIAGNOSIS — F41.1 GAD (GENERALIZED ANXIETY DISORDER): ICD-10-CM

## 2025-04-21 PROCEDURE — 90837 PSYTX W PT 60 MINUTES: CPT

## 2025-04-21 NOTE — PATIENT INSTRUCTIONS
"Client can practice the utilization of the DBT technique of AAA (awareness, acceptance, and action) by practicing the identification of all 3 phases of the cognitive triangle (thought, emotion, and behavior) and challenging hurtful or negative thoughts in writing or out loud for discussion during the next session.    Safety Plan:    Follow-up:  Client agrees to keep all follow-up appointments with [facility] and continue using support system as needed.       Resources:     Contact Office at [phone], 988, 911, Text \"HOME\" to [phone], and/or go to the nearest Hospital/ER in the event of a crisis.     "

## 2025-04-21 NOTE — PROGRESS NOTES
"        Progress Note     Date: 04/21/2025   Client Name: Kenya Nicholson   MRN: [id number]   Start Time:    8 AM end Time:    8:58 AM    Diagnoses and all orders for this visit:    1. Post traumatic stress disorder (PTSD) (Primary)    2. JENNIFER (generalized anxiety disorder)    3. Obsessive-compulsive disorder, unspecified type          Active Symptoms/Chief Complainant:   Anxiety, depressed mood, and obsessive compulsive intrusive thoughts associated with key moments of trauma    MENTAL STATUS EXAM   General Appearance:  Cleanly groomed and dressed  Eye Contact:  Good eye contact  Attitude:  Cooperative  Speech:  Normal rate, tone, volume  Mood and affect:  Normal, pleasant  Thought Process:  Logical  Associations/ Thought Content:  No delusions  Hallucinations:  None  Suicidal Ideations:  Not present  Homicidal Ideation:  Not present  Orientation:  Person, place and time  Insight:  Good  Judgement:  Good       Care Plan:  Goal:     \" I want to be able to increase the amount of time in a day where I am not having intrusive compulsive thoughts.\"     Objective:    Over the next 90 days, I will utilize learned skills and techniques to reduce obsessive-compulsive thoughts from daily times a week to 5 to 6 days a week, as measured by Client reports and reductions in the JENNFIER 7 overall score.       Client reported making some progress on current care plan.  Ongoing treatment is still needed for this client due to goals not being fully realized or symptomology not being resolved.    Risk to self:  Low      Risk to others:  Low      Progress Note Intervention       Progress Note Intervention:     Clinician met with the Client at the [facility]. ?     Clinician utilized MI to assess and assist Client in processing through feelings, identify possible pathways for forward movement, and gain insight.     Clinician provided support through active listening, reflection, and validation.     Clinician utilized? CBT " technique of reframing to aid the client in shifting her perspective on some of today's discussed topics.  Clinician also utilized the DBT technique of interpersonal effectiveness to work through and discuss for choice decision making to aid her as she works through the AAA process of DBT where awareness plus acceptance leads to more meaningful actions.    Follow-up:    Clinician plans to work on DBT technique of AAA during the next session. ?     Client response:     Client processed feelings on the loss of her best friend's mother this week, recent triggers/stressors to feeling drained frustrated and depleted, her upcoming trip with her family, recent illnesses that hit the house (son and ), and ways that avoidance and her obsessive compulsive thought patterns are leading to these feelings of being drained.    Client was open, receptive, and engaged during the session. Client seemed to make connections with today's discussion and exercises.    Patient acknowledged and verbally consented to continue with treatment with this Clinician and continue working on goals outlined in the current treatment plan.      Dictated using Dragon Speech Recognition.    Kvng Rondon LCSW  [id number]

## 2025-04-24 ENCOUNTER — OFFICE VISIT (OUTPATIENT)
Dept: GASTROENTEROLOGY | Facility: CLINIC | Age: 30
End: 2025-04-24
Payer: COMMERCIAL

## 2025-04-24 ENCOUNTER — LAB (OUTPATIENT)
Dept: LAB | Facility: HOSPITAL | Age: 30
End: 2025-04-24
Payer: COMMERCIAL

## 2025-04-24 VITALS
DIASTOLIC BLOOD PRESSURE: 78 MMHG | OXYGEN SATURATION: 98 % | HEIGHT: 65 IN | TEMPERATURE: 97.5 F | HEART RATE: 79 BPM | BODY MASS INDEX: 38.72 KG/M2 | SYSTOLIC BLOOD PRESSURE: 128 MMHG | WEIGHT: 232.4 LBS

## 2025-04-24 DIAGNOSIS — R11.2 NAUSEA AND VOMITING, UNSPECIFIED VOMITING TYPE: ICD-10-CM

## 2025-04-24 DIAGNOSIS — R19.7 INTERMITTENT DIARRHEA: ICD-10-CM

## 2025-04-24 DIAGNOSIS — R10.9 INTERMITTENT ABDOMINAL PAIN: ICD-10-CM

## 2025-04-24 DIAGNOSIS — R93.89 ABNORMAL FINDING ON CT SCAN: ICD-10-CM

## 2025-04-24 DIAGNOSIS — K52.9 ENTERITIS: ICD-10-CM

## 2025-04-24 DIAGNOSIS — R79.82 ELEVATED C-REACTIVE PROTEIN (CRP): ICD-10-CM

## 2025-04-24 DIAGNOSIS — R10.9 INTERMITTENT ABDOMINAL PAIN: Primary | ICD-10-CM

## 2025-04-24 LAB
ALBUMIN SERPL-MCNC: 3.9 G/DL (ref 3.5–5.2)
ALBUMIN/GLOB SERPL: 1.1 G/DL
ALP SERPL-CCNC: 74 U/L (ref 39–117)
ALT SERPL W P-5'-P-CCNC: 16 U/L (ref 1–33)
ANION GAP SERPL CALCULATED.3IONS-SCNC: 10.3 MMOL/L (ref 5–15)
AST SERPL-CCNC: 16 U/L (ref 1–32)
BILIRUB SERPL-MCNC: <0.2 MG/DL (ref 0–1.2)
BUN SERPL-MCNC: 17 MG/DL (ref 6–20)
BUN/CREAT SERPL: 26.6 (ref 7–25)
CALCIUM SPEC-SCNC: 9.5 MG/DL (ref 8.6–10.5)
CHLORIDE SERPL-SCNC: 107 MMOL/L (ref 98–107)
CO2 SERPL-SCNC: 23.7 MMOL/L (ref 22–29)
CREAT SERPL-MCNC: 0.64 MG/DL (ref 0.57–1)
CRP SERPL-MCNC: 1.25 MG/DL (ref 0–0.5)
DEPRECATED RDW RBC AUTO: 44.1 FL (ref 37–54)
EGFRCR SERPLBLD CKD-EPI 2021: 122.1 ML/MIN/1.73
ERYTHROCYTE [DISTWIDTH] IN BLOOD BY AUTOMATED COUNT: 12.8 % (ref 12.3–15.4)
GLOBULIN UR ELPH-MCNC: 3.7 GM/DL
GLUCOSE SERPL-MCNC: 86 MG/DL (ref 65–99)
HCT VFR BLD AUTO: 38.8 % (ref 34–46.6)
HGB BLD-MCNC: 13.1 G/DL (ref 12–15.9)
MCH RBC QN AUTO: 31.7 PG (ref 26.6–33)
MCHC RBC AUTO-ENTMCNC: 33.8 G/DL (ref 31.5–35.7)
MCV RBC AUTO: 93.9 FL (ref 79–97)
PLATELET # BLD AUTO: 261 10*3/MM3 (ref 140–450)
PMV BLD AUTO: 10.7 FL (ref 6–12)
POTASSIUM SERPL-SCNC: 4.7 MMOL/L (ref 3.5–5.2)
PROT SERPL-MCNC: 7.6 G/DL (ref 6–8.5)
RBC # BLD AUTO: 4.13 10*6/MM3 (ref 3.77–5.28)
SODIUM SERPL-SCNC: 141 MMOL/L (ref 136–145)
WBC NRBC COR # BLD AUTO: 6.81 10*3/MM3 (ref 3.4–10.8)

## 2025-04-24 PROCEDURE — 86231 EMA EACH IG CLASS: CPT

## 2025-04-24 PROCEDURE — 80053 COMPREHEN METABOLIC PANEL: CPT | Performed by: PHYSICIAN ASSISTANT

## 2025-04-24 PROCEDURE — 36415 COLL VENOUS BLD VENIPUNCTURE: CPT

## 2025-04-24 PROCEDURE — 86364 TISS TRNSGLTMNASE EA IG CLAS: CPT

## 2025-04-24 PROCEDURE — 86140 C-REACTIVE PROTEIN: CPT

## 2025-04-24 PROCEDURE — 85027 COMPLETE CBC AUTOMATED: CPT | Performed by: PHYSICIAN ASSISTANT

## 2025-04-24 PROCEDURE — 82784 ASSAY IGA/IGD/IGG/IGM EACH: CPT

## 2025-04-24 NOTE — PROGRESS NOTES
New Patient Consultation     Patient Name: Kenya Nicholson  : 1995   MRN: [id number]     Chief Complaint:    Chief Complaint   Patient presents with    [facility]     Establish care due to referral from PCP. Pt has been to the ED department quiet a bit due to abdominal pain. Pt is not currently having any GI Symptoms, but is concerned about the random abdominal pain.       History of Present Illness: Kenya Nicholson is a 29 y.o. female, PMH includes HTN, HL, depression, anxiety, who is here today for a Gastroenterology Consultation for abdominal pain.     Pt reports intermittent episodes of generalized abdominal pain that started in 2024. She reports onset of loose, urgent stools and then severe abdominal pain thereafter. She does endorse some associated nausea and vomiting with these episodes. She was seen at [facility] ED 2024, 2025 and had essentially unremarkable findings at those times. Her sx were explained by viral process and she was discharged with supportive care including bentyl. She denies specific aggravating foods, factors or timing otherwise. Pt was then seen at  ED 2025 for evaluation of abdominal pain. CRP elevated at 29.5 at that time. CBC, CMP unremarkable.     Pt otherwise feels well from a GI standpoint and has a complete, formed BM every 1-2 days. She is following a low carb, high protein diet with adequate oral fiber intake.    Patient denies associated fever, chills, indigestion, constipation, hematemesis, dysphagia, hematochezia, melena, unintentional weight loss or gain, dysuria, jaundice or bruising.    Patient denies personal or FHx of PUD, H Pylori, gastritis, pancreatitis, UC, Crohn's disease, IBS, colon or gastric cancers. Aunt and first cousin with Celiac disease. Sister with gluten intolerance. Pt denies EtOH, tobacco, illicit substance use. Rare NSAID use. 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     CT A/P w/ contrast  2025: Mild wall thickening of several loops of nondistended small bowel within the left lower quadrant with diffuse adjacent mesenteric fat stranding.     CT A/P w/ contrast 2025: No definite acute findings in the abdomen or pelvis. Prominent appendix which measures just above the typical upper limits of normal, no periappendiceal stranding or inflammatory changes seen. Early appendicitis is considered less likely.     CT A/P w/ contrast 2024: Scattered colonic fluid may indicate acute diarrheal illness. Fluid filled appendix at upper limits of normal, no periappendiceal stranding noted. Early appendicitis is considered less likely.     No previous EGD / CSY.     Subjective      Review of Systems:   Review of Systems   Constitutional:  Negative for appetite change, chills, diaphoresis, fatigue, fever, unexpected weight gain and unexpected weight loss.   HENT:  Negative for drooling, facial swelling, mouth sores, rhinorrhea, sore throat, tinnitus, trouble swallowing and voice change.    Eyes: Negative.    Respiratory:  Negative for cough, chest tightness and shortness of breath.    Cardiovascular:  Negative for chest pain.   Gastrointestinal:  Positive for abdominal pain (intermittent), diarrhea (intermittent) and nausea (intermittent). Negative for blood in stool, constipation, GERD and indigestion.   Genitourinary:  Negative for dysuria, flank pain, hematuria and pelvic pain.   Skin:  Negative for color change, pallor and rash.   All other systems reviewed and are negative.      Past Medical History: History reviewed. No pertinent past medical history.    Past Surgical History:   Past Surgical History:   Procedure Laterality Date    ANKLE SURGERY Right      SECTION      OVARY SURGERY Left        Family History: History reviewed. No pertinent family history.    Social History:   Social History     Socioeconomic History    Marital status:    Tobacco Use    Smoking status: Never    Smokeless  "tobacco: Never   Vaping Use    Vaping status: Never Used   Substance and Sexual Activity    Alcohol use: Never    Drug use: Never    Sexual activity: Defer       Alcohol/Tobacco History:   Social History     Substance and Sexual Activity   Alcohol Use Never     Social History     Tobacco Use   Smoking Status Never   Smokeless Tobacco Never       Medications:     Current Outpatient Medications:     azithromycin (Zithromax Z-Nicola) 250 MG tablet, Take 2 tablets by mouth on day 1, then 1 tablet daily on days 2-5, Disp: 6 tablet, Rfl: 0    dicyclomine (BENTYL) 20 MG tablet, Take 1 tablet by mouth Every 6 (Six) Hours., Disp: 12 tablet, Rfl: 0    Drospirenone-Ethinyl Estradiol (SINAN PO), Take  by mouth., Disp: , Rfl:     escitalopram (Lexapro) 20 MG tablet, Take 1 tablet by mouth Daily., Disp: 90 tablet, Rfl: 1    lisinopril (PRINIVIL,ZESTRIL) 10 MG tablet, Take 1 tablet by mouth Daily., Disp: 90 tablet, Rfl: 3    SEMAGLUTIDE PO, Inject 0.25mL (1mg=25 units) subcutaneously once weekly for four (4) weeks, Disp: , Rfl:     Semaglutide-Weight Management 0.25 MG/0.5ML solution auto-injector, Inject 0.5 mL under the skin into the appropriate area as directed 1 (One) Time Per Week., Disp: 2 mL, Rfl: 5    triamcinolone (KENALOG) 0.1 % ointment, Apply 1 Application topically to the appropriate area as directed 2 (Two) Times a Day., Disp: 453.6 g, Rfl: 5    Allergies:   Allergies   Allergen Reactions    Minocycline Other (See Comments)     Hydrocephalus as child    Red Dye Other (See Comments)     Severe vomiting       Objective     Physical Exam:  Vital Signs:   Vitals:    04/24/25 0827   BP: 128/78   Pulse: 79   Temp: 97.5 °F (36.4 °C)   TempSrc: Temporal   SpO2: 98%   Weight: 105 kg (232 lb 6.4 oz)   Height: 165.1 cm (65\")   PainSc: 0-No pain     Body mass index is 38.67 kg/m².     Physical Exam  Vitals and nursing note reviewed.   Constitutional:       Appearance: Normal appearance. She is normal weight. She is not ill-appearing " or diaphoretic.      Comments: BMI 38.67. Pleasantly conversant.    HENT:      Head: Normocephalic and atraumatic.      Right Ear: External ear normal.      Left Ear: External ear normal.      Nose: Nose normal.      Mouth/Throat:      Mouth: Mucous membranes are moist.      Pharynx: Oropharynx is clear.   Eyes:      Conjunctiva/sclera: Conjunctivae normal.      Pupils: Pupils are equal, round, and reactive to light.   Neck:      Thyroid: No thyromegaly.   Cardiovascular:      Rate and Rhythm: Normal rate and regular rhythm.      Pulses: Normal pulses.      Heart sounds: Normal heart sounds.   Pulmonary:      Effort: Pulmonary effort is normal.      Breath sounds: Normal breath sounds.   Abdominal:      General: Abdomen is flat. Bowel sounds are normal. There is no distension.      Tenderness: There is no abdominal tenderness.   Musculoskeletal:         General: Normal range of motion.      Cervical back: Normal range of motion and neck supple.   Skin:     General: Skin is warm and dry.   Neurological:      General: No focal deficit present.      Mental Status: She is oriented to person, place, and time.   Psychiatric:         Mood and Affect: Mood normal.         Assessment / Plan      Assessment/Plan:   There are no diagnoses linked to this encounter.     Intermittent abdominal pain  Intermittent diarrhea  Nausea and vomiting  Elevated CRP  Abnormal findings on CT A/P   - previous office notes, hospital records, labs, imaging reports reviewed with patient    - obtain CBC, CMP, CRP, celiac panel    - schedule for EGD / CSY   - follow up in clinic after completion of above studies   - call clinic at any time for questions or new / worsened sx      Follow Up:   Return for Next scheduled follow up.    Plan of care reviewed with the patient at the conclusion of today's visit.  Education was provided regarding diagnosis, management, and any prescribed or recommended OTC medications.  Patient verbalized understanding of  and agreement with management plan.     NOTE TO PATIENT: The 21st Century Cures Act makes medical notes like these available to patients in the interest of transparency. However, be advised this is a medical document. It is intended as peer to peer communication. It is written in medical language and may contain abbreviations or verbiage that are unfamiliar. It may appear blunt or direct. Medical documents are intended to carry relevant information, facts as evident, and the clinical opinion of the practitioner.     Time Statement:   Discussed plan of care in detail with patient today. Patient verbally understands and agrees. I have spent 45 minutes reviewing available diagnostics, obtaining history, examining the patient, developing a treatment plan, and educating the patient on disease process and plan of care.    Jacqui Mata PA-C   [facility]

## 2025-04-25 ENCOUNTER — RESULTS FOLLOW-UP (OUTPATIENT)
Dept: GASTROENTEROLOGY | Facility: CLINIC | Age: 30
End: 2025-04-25
Payer: COMMERCIAL

## 2025-04-25 LAB
ENDOMYSIUM IGA SER QL: NEGATIVE
IGA SERPL-MCNC: 223 MG/DL (ref 87–352)
TTG IGA SER-ACNC: <2 U/ML (ref 0–3)

## 2025-04-25 NOTE — PROGRESS NOTES
Please let Kenya know that her labs reveal essentially normal CBC, CMP. Her CRP is consistently elevated, but improved slightly from two months ago. Celiac panel is pending.

## 2025-04-25 NOTE — LETTER
Kenya Nicholson  [address] [address]    April 25, 2025     Dear Ms. Nicholson:    Below are the results from your recent visit:    Resulted Orders   CBC (No Diff)   Result Value Ref Range    WBC 6.81 3.40 - 10.80 10*3/mm3    RBC 4.13 3.77 - 5.28 10*6/mm3    Hemoglobin 13.1 12.0 - 15.9 g/dL    Hematocrit 38.8 34.0 - 46.6 %    MCV 93.9 79.0 - 97.0 fL    MCH 31.7 26.6 - 33.0 pg    MCHC 33.8 31.5 - 35.7 g/dL    RDW 12.8 12.3 - 15.4 %    RDW-SD 44.1 37.0 - 54.0 fl    MPV 10.7 6.0 - 12.0 fL    Platelets 261 140 - 450 10*3/mm3   Comprehensive Metabolic Panel   Result Value Ref Range    Glucose 86 65 - 99 mg/dL    BUN 17 6 - 20 mg/dL    Creatinine 0.64 0.57 - 1.00 mg/dL    Sodium 141 136 - 145 mmol/L    Potassium 4.7 3.5 - 5.2 mmol/L    Chloride 107 98 - 107 mmol/L    CO2 23.7 22.0 - 29.0 mmol/L    Calcium 9.5 8.6 - 10.5 mg/dL    Total Protein 7.6 6.0 - 8.5 g/dL    Albumin 3.9 3.5 - 5.2 g/dL    ALT (SGPT) 16 1 - 33 U/L    AST (SGOT) 16 1 - 32 U/L    Alkaline Phosphatase 74 39 - 117 U/L    Total Bilirubin <0.2 0.0 - 1.2 mg/dL    Globulin 3.7 gm/dL    A/G Ratio 1.1 g/dL    BUN/Creatinine Ratio 26.6 (H) 7.0 - 25.0    Anion Gap 10.3 5.0 - 15.0 mmol/L    eGFR 122.1 >60.0 mL/min/1.73   C-reactive Protein   Result Value Ref Range    C-Reactive Protein 1.25 (H) 0.00 - 0.50 mg/dL       labs reveal essentially normal CBC, CMP. Her CRP is consistently elevated, but improved slightly from two months ago. Celiac panel is pending.          Comprehensive Metabolic Panel; CBC (No Diff)  If you have any questions or concerns, please don't hesitate to call.         Sincerely,        Jacqui Mata PA-C

## 2025-04-25 NOTE — TELEPHONE ENCOUNTER
Called and spoke with pt and advised of results, pt agrees and understands. Pt does not have any questions and or concerns at this moment.

## 2025-05-06 ENCOUNTER — OFFICE VISIT (OUTPATIENT)
Age: 30
End: 2025-05-06
Payer: COMMERCIAL

## 2025-05-06 DIAGNOSIS — F42.9 OBSESSIVE-COMPULSIVE DISORDER, UNSPECIFIED TYPE: ICD-10-CM

## 2025-05-06 DIAGNOSIS — F41.1 GAD (GENERALIZED ANXIETY DISORDER): ICD-10-CM

## 2025-05-06 DIAGNOSIS — F43.10 POST TRAUMATIC STRESS DISORDER (PTSD): Primary | ICD-10-CM

## 2025-05-06 NOTE — PATIENT INSTRUCTIONS
"Client can practice the utilization of the DBT technique of AAA (awareness, acceptance, and action) by practicing the identification of all 3 phases of the cognitive triangle (thought, emotion, and behavior) and challenging hurtful or negative thoughts in writing or out loud for discussion during the next session.    Safety Plan:    Follow-up:  Client agrees to keep all follow-up appointments with [facility] and continue using support system as needed.       Resources:     Contact Office at [phone], [phone], 911, Text \"HOME\" to [phone], and/or go to the nearest Hospital/ER in the event of a crisis.     "

## 2025-05-06 NOTE — PROGRESS NOTES
"        Progress Note     Date: 05/06/2025   Client Name: Kenya Nicholson   MRN: [id number]   Start Time:    7:56 AM end Time:    8:51 AM    Diagnoses and all orders for this visit:    1. Post traumatic stress disorder (PTSD) (Primary)    2. JENNIFER (generalized anxiety disorder)    3. Obsessive-compulsive disorder, unspecified type          Active Symptoms/Chief Complainant:   Moments of anxiety, depressed mood, and obsessive compulsive intrusive thoughts associated with key moments of trauma    MENTAL STATUS EXAM   General Appearance:  Cleanly groomed and dressed  Eye Contact:  Good eye contact  Attitude:  Cooperative  Speech:  Normal rate, tone, volume  Mood and affect:  Normal, pleasant  Thought Process:  Logical  Associations/ Thought Content:  No delusions  Hallucinations:  None  Suicidal Ideations:  Not present  Homicidal Ideation:  Not present  Orientation:  Person, place and time  Insight:  Good  Judgement:  Good       Care Plan:  Goal:     \" I want to be able to increase the amount of time in a day where I am not having intrusive compulsive thoughts.\"     Objective:    Over the next 90 days, I will utilize learned skills and techniques to reduce obsessive-compulsive thoughts from daily times a week to 5 to 6 days a week, as measured by Client reports and reductions in the JENNIFER 7 overall score.       Client reported making some progress on current care plan.  Ongoing treatment is still needed for this client due to goals not being fully realized or symptomology not being resolved.    Risk to self:  Low      Risk to others:  Low      Progress Note Intervention       Progress Note Intervention:     Clinician met with the Client at the [facility]. ?     Clinician utilized MI to assess and assist Client in processing through feelings, identify possible pathways for forward movement, and gain insight.     Clinician provided support through active listening, reflection, and validation.     Clinician " utilized? CBT technique of reframing to aid the client in shifting her perspective on some of today's discussed topics.    Follow-up:    Clinician plans to work on interpersonal effectiveness during the next session. ?     Client response:     Client processed feelings on recent vacation, triggers/stressors to feelings of frustration and negative cognitions about her parenting, discussion on her parenting techniques and styles and how to contrast with her sisters, and recent interpersonal conflicts.    Client was open, receptive, and engaged during the session. Client seemed to make connections with today's discussion and exercises.    Patient acknowledged and verbally consented to continue with treatment with this Clinician and continue working on goals outlined in the current treatment plan.      Dictated using Dragon Speech Recognition.    Kvng Rondon LCSW  [facility]

## 2025-05-21 RX ORDER — SODIUM, POTASSIUM,MAG SULFATES 17.5-3.13G
1 SOLUTION, RECONSTITUTED, ORAL ORAL TAKE AS DIRECTED
Qty: 354 ML | Refills: 0 | Status: SHIPPED | OUTPATIENT
Start: 2025-05-21 | End: 2025-05-21

## 2025-05-28 ENCOUNTER — OUTSIDE FACILITY SERVICE (OUTPATIENT)
Dept: GASTROENTEROLOGY | Facility: CLINIC | Age: 30
End: 2025-05-28
Payer: COMMERCIAL

## 2025-05-28 ENCOUNTER — LAB REQUISITION (OUTPATIENT)
Dept: LAB | Facility: HOSPITAL | Age: 30
End: 2025-05-28
Payer: COMMERCIAL

## 2025-05-28 DIAGNOSIS — R10.84 GENERALIZED ABDOMINAL PAIN: ICD-10-CM

## 2025-05-28 DIAGNOSIS — K44.9 DIAPHRAGMATIC HERNIA WITHOUT OBSTRUCTION OR GANGRENE: ICD-10-CM

## 2025-05-28 DIAGNOSIS — R19.7 DIARRHEA, UNSPECIFIED: ICD-10-CM

## 2025-05-28 DIAGNOSIS — K59.00 CONSTIPATION, UNSPECIFIED: ICD-10-CM

## 2025-05-28 DIAGNOSIS — K21.00 GASTRO-ESOPHAGEAL REFLUX DISEASE WITH ESOPHAGITIS, WITHOUT BLEEDING: ICD-10-CM

## 2025-05-28 PROCEDURE — 43239 EGD BIOPSY SINGLE/MULTIPLE: CPT | Performed by: INTERNAL MEDICINE

## 2025-05-28 PROCEDURE — 88305 TISSUE EXAM BY PATHOLOGIST: CPT | Performed by: INTERNAL MEDICINE

## 2025-05-28 PROCEDURE — 45380 COLONOSCOPY AND BIOPSY: CPT | Performed by: INTERNAL MEDICINE

## 2025-05-29 ENCOUNTER — RESULTS FOLLOW-UP (OUTPATIENT)
Dept: LAB | Facility: HOSPITAL | Age: 30
End: 2025-05-29
Payer: COMMERCIAL

## 2025-05-29 LAB — REF LAB TEST METHOD: NORMAL

## 2025-05-29 NOTE — LETTER
Alta Lord  [address] [address]    May 30, 2025     Dear Ms. Lord:    Below are the results from your recent visit:    Resulted Orders   TISSUE EXAM, [facility] ([facility])   Result Value Ref Range    Reference Lab Report       [facility]  [address] [place] [address]  Phone: [phone] or [phone]  Fax: [phone]  Kvng Calixto M.D., Medical Director    PATIENT NAME                                     LABORATORY NO.  [id number]   ALTA LORD                                  [id number]  [id number]                                 AGE                    SEX   SSN              CLIENT REF #  [facility]                   29        1995      F     [id number]      [id number]    [address]                      REQUESTING M.D.           ATTENDING [place] [address]                        ELIF BANERJEE ALLYSON GREGORY  DATE COLLECTED            DATE RECEIVED          DATE REPORTED  2025    DIAGNOSIS:  A.     DUODENUM, BIOPSY:  Duodenal type mucosa with no significant histopathologic abnormalities  Negative for Celiac disease, metaplasia,  microorganisms or atypia  B.     GASTRIC ANTRUM, BIOPSY:  Gastric mucosa with focal minimal chronic inflammation  Negative for H. pylori, metaplasia or dysplasia  C.     STOMACH, BODY, BIOPSY:  Gastric mucosa with focal minimal chronic inflammation  Negative for H. pylori, metaplasia or dysplasia  D.     ESOPHAGUS, BIOPSY, DISTAL:  Reflux esophagitis (0 eosinophils/hpf)  Negative for intestinal metaplasia, dysplasia or malignancy  E.     TERMINAL ILEUM BIOPSY:  Intestinal mucosa without pathologic alterations  F.     ASCENDING COLON BIOPSY:  Colonic type mucosa with no significant histopathologic abnormalities  Negative for architectural distortion, significant inflammation or atypia  G.      "TRANSVERSE COLON BIOPSY:  Colonic type mucosa with no significant histopathologic abnormalities  Negative for architectural distortion, significant inflammation or atypia  H.     DESCENDING COLON BIOPSY:  Colonic type mucosa with no significant histopathologic abnormalities  Negative for  architectural distortion, significant inflammation or atypia  I.     RECTAL BIOPSY:  Colonic type mucosa with no significant histopathologic abnormalities  Negative for architectural distortion, significant inflammation or atypia    RLL    CLINICAL HISTORY:  Generalized abdominal pain, Diarrhea, Diaphragmatic hernia, Gastro-esophageal  reflux, Constipation,  R10.84  R19.7  K44.9  K21.00  K59.00    SPECIMENS RECEIVED:  A.    DUODENUM, BIOPSY  B.    GASTRIC ANTRUM, BIOPSY  C.    STOMACH, BODY, BIOPSY  D.    ESOPHAGUS, BIOPSY, DISTAL  E.    TERMINAL ILEUM BIOPSY  F.    ASCENDING COLON BIOPSY  G.    TRANSVERSE COLON BIOPSY  H.    DESCENDING COLON BIOPSY  I.    RECTAL BIOPSY    MICROSCOPIC DESCRIPTION:  Tissue blocks are prepared and slides are examined microscopically on all  specimens. See diagnosis for details.    Professional interpretation rendered by Kvng Calixto M.D., F.C.A.P. at  [facility] [employer], [address] [place] [address].    GROSS DESCRIPTION:  A.    Labeled  \"duodenum\", are multiple pieces of tan soft tissue measuring 0.7 x  0.5 x 0.1 cm in aggregate, submitted entirely in 1 block.  SM  B.    Labeled \"stomach; gastric antrum\", are multiple pieces of tan soft tissue  measuring 0.5 x 0.2 x 0.1 cm in aggregate, submitted entirely in 1 block.  C.    Labeled \"stomach; gastric body\", are multiple pieces of tan soft tissue  measuring 0.7 x 0.3 x 0.1 cm in aggregate, submitted entirely in 1 block.  D.    Labeled \"distal esophagus\", are multiple pieces of tan soft tissue measuring  0.7 x 0.6 x 0.1 cm in aggregate, submitted entirely in 1 block.  E.    Labeled \"terminal ileum\", are multiple pieces of tan soft tissue " "measuring  0.8 x 0.4 x 0.1 cm in aggregate, submitted entirely in 1 block.  F.    Labeled \"ascending colon\", are multiple pieces of tan soft tissue measuring  0.5 x 0.5 x 0.1 cm in aggregate, submitted entirely in 1 block.  G.    Labeled \"transverse colon\", are multiple pieces of tan soft tissue measuring  0.5 x 0.5 x 0.1 cm in  aggregate, submitted entirely in 1 block.  H.    Labeled \"descending colon\", are multiple pieces of tan soft tissue  measuring 0.8 x 0.5 x 0.1 cm in aggregate, submitted entirely in 1 block.  I.    Labeled \"rectum\", are multiple pieces of tan soft tissue measuring 0.5 x 0.5  x 0.1 cm in aggregate, submitted entirely in 1 block.    REVIEWED, DIAGNOSED AND ELECTRONICALLY  SIGNED BY:    Kvng Calixto M.D., F.C.A.P.  CPT CODES:  88305x9       Per Dr. Morales:  There was some evidence of reflux. No evidence for H. pylori or celiac. All the colon biopsies were normal.              If you have any questions or concerns, please don't hesitate to call.         Sincerely,        Nikolay Morales MD      "

## 2025-05-30 NOTE — PROGRESS NOTES
Per Dr. Morales:  There was some evidence of reflux. No evidence for H. pylori or celiac. All the colon biopsies were normal.

## 2025-06-04 ENCOUNTER — OFFICE VISIT (OUTPATIENT)
Age: 30
End: 2025-06-04
Payer: COMMERCIAL

## 2025-06-04 DIAGNOSIS — F43.10 POST TRAUMATIC STRESS DISORDER (PTSD): Primary | ICD-10-CM

## 2025-06-04 DIAGNOSIS — F41.1 GAD (GENERALIZED ANXIETY DISORDER): ICD-10-CM

## 2025-06-04 DIAGNOSIS — F42.9 OBSESSIVE-COMPULSIVE DISORDER, UNSPECIFIED TYPE: ICD-10-CM

## 2025-06-04 NOTE — PLAN OF CARE
"Goal:     \" I want to continue working on being able to increase the amount of time in a day where I am not having intrusive compulsive thoughts.\"     Objective:    Over the next 90 days, I will utilize learned skills and techniques to reduce obsessive-compulsive thoughts from 5 to 6 days a week to 4-5 days a week, as measured by Client reports and reductions in the JENNIFER 7 overall score.     "

## 2025-06-04 NOTE — PATIENT INSTRUCTIONS
"Client can practice the utilization of the DBT technique of AAA (awareness, acceptance, and action) by practicing the identification of all 3 phases of the cognitive triangle (thought, emotion, and behavior) and challenging hurtful or negative thoughts in writing or out loud for discussion during the next session.    Safety Plan:    Follow-up:  Client agrees to keep all follow-up appointments with [facility] and continue using support system as needed.       Resources:     Contact Office at [phone], [phone], 911, Text \"HOME\" to [phone], and/or go to the nearest Hospital/ER in the event of a crisis.     "

## 2025-06-04 NOTE — TREATMENT PLAN
"Multi-Disciplinary Problems (from Behavioral Health Treatment Plan)      Active Problems       Problem: Trauma and Stressor Related Disorders  Start Date: 06/04/25      Problem Details: Problem Statement:     Client is struggling with moments of anxiety, depressed mood, and obsessive compulsive intrusive thoughts associated with key moments of trauma    Plan Discharge:      Services will be discontinued upon completion of treatment goals, 90 days without services, 3 No Shows or 3 same-day cancels in a 12-month period, Client reports services are no longer needed, or when \"I I am able to define my sense of normalcy and be able to resolve obsessive compulsive thoughts.\"            Goal Priority Start Date Expected End Date End Date    Patient will process and move through trauma in a way that improves self regard and the patients ability to function optimally in the world around them. -- 06/04/25 12/03/25 --    Goal Details: Goal:     \" I want to continue working on being able to increase the amount of time in a day where I am not having intrusive compulsive thoughts.\"     Objective:    Over the next 90 days, I will utilize learned skills and techniques to reduce obsessive-compulsive thoughts from 5 to 6 days a week to 4-5 days a week, as measured by Client reports and reductions in the JENNIFER 7 overall score.             Goal Intervention Frequency Start Date End Date    Assist patient in identifying ways that trauma has negatively impacted their view of themselves and the world. Weekly 06/04/25 --    Intervention Details: Interventions:      Clinician will utilize therapeutic modalities of MI, reflective listening, and CBT/DBT/SFT/EMDR/etc during bi-weekly sessions to assist the Client in achieving their goals.  Clinician will measure progress and document progress through the use of Client reports in each session and within the Care Plan on a month basis.        Client will utilize strengths such as a desire for " change and creativity, to practice taught skills in between sessions and complete therapeutic assignments to aid in them is achieving their desired goals.           Goal Intervention Frequency Start Date End Date    Process trauma in the context of the safe session environment. Weekly 06/04/25 --    Intervention Details: Interventions:      Clinician will utilize therapeutic modalities of MI, reflective listening, and CBT/DBT/SFT/EMDR/etc during bi-weekly sessions to assist the Client in achieving their goals.  Clinician will measure progress and document progress through the use of Client reports in each session and within the Care Plan on a month basis.        Client will utilize strengths such as a desire for change and creativity, to practice taught skills in between sessions and complete therapeutic assignments to aid in them is achieving their desired goals.           Goal Intervention Frequency Start Date End Date    Develop a plan of behavior changes that will reduce the stress of the trauma. Weekly 06/04/25 --    Intervention Details: Interventions:      Clinician will utilize therapeutic modalities of MI, reflective listening, and CBT/DBT/SFT/EMDR/etc during bi-weekly sessions to assist the Client in achieving their goals.  Clinician will measure progress and document progress through the use of Client reports in each session and within the Care Plan on a month basis.        Client will utilize strengths such as a desire for change and creativity, to practice taught skills in between sessions and complete therapeutic assignments to aid in them is achieving their desired goals.                           Reviewed By       Kvng Rondon, FRANKW 06/04/25 7580                     I have discussed and reviewed this treatment plan with the patient.

## 2025-06-04 NOTE — PROGRESS NOTES
"        Progress Note     Date: 06/04/2025  Client Name: Kenya Nicholson  MRN: [id number]  Start Time:    8:01 AM end Time:    9 AM     Diagnoses and all orders for this visit:    1. Post traumatic stress disorder (PTSD) (Primary)    2. JENNIFER (generalized anxiety disorder)    3. Obsessive-compulsive disorder, unspecified type         Active Symptoms/Chief Complainant:   Moments of anxiety, depressed mood, and obsessive compulsive intrusive thoughts associated with key moments of trauma    MENTAL STATUS EXAM   General Appearance:  Cleanly groomed and dressed  Eye Contact:  Good eye contact  Attitude:  Cooperative  Speech:  Normal rate, tone, volume  Mood and affect:  Normal, pleasant  Thought Process:  Logical  Associations/ Thought Content:  No delusions  Hallucinations:  None  Suicidal Ideations:  Not present  Homicidal Ideation:  Not present  Orientation:  Person, time and place  Insight:  Good  Judgement:  Good         Care Plan:  Goal:     \" I want to continue working on being able to increase the amount of time in a day where I am not having intrusive compulsive thoughts.\"     Objective:    Over the next 90 days, I will utilize learned skills and techniques to reduce obsessive-compulsive thoughts from 5 to 6 days a week to 4-5 days a week, as measured by Client reports and reductions in the JENNIFER 7 overall score.       New plan of care was created and entered today with the client.  Too soon to assess any type of progress due to new plan being entered this date.  Client reported some progress on identified goals from the previous plan of care.  Ongoing treatment is still needed for this client due to newly established goals and client's symptomology not being resolved.     PHQ-9  Little interest or pleasure in doing things? Not at all   Feeling down, depressed, or hopeless? Several days   PHQ-2 Total Score 1   Trouble falling or staying asleep, or sleeping too much? Not at all   Feeling tired or having little energy? " Not at all   Poor appetite or overeating? Several days   Feeling bad about yourself - or that you are a failure or have let yourself or your family down? Several days   Trouble concentrating on things, such as reading the newspaper or watching television? Several days   Moving or speaking so slowly that other people could have noticed? Or the opposite - being so fidgety or restless that you have been moving around a lot more than usual? Not at all     Thoughts that you would be better off dead, or of hurting yourself in some way? Not at all   PHQ-9 Total Score 4   If you checked off any problems, how difficult have these problems made it for you to do your work, take care of things at home, or get along with other people? Somewhat difficult           JENNIFER 7  JENNIFER 7 Total Score: 9       Risk to self:  Low      Risk others:  Low      Progress Note Intervention/Client Response     Clinician met with the Client at the [facility]. ?     Clinician utilized MI to assess and help the Client identify barriers for change, assess readiness for change, assist Client in processing through feelings, identify possible pathways for forward movement, gain insight, and complete the Care Plan/Treatment Plan.     Clinician provided support through active listening, reflection, and validation.     Clinician utilized? DBT technique of AAA to aid the client shifting her perspective and seeing how awareness and acceptance is leading to meaningful actions and the overcoming of obsessive-compulsive intrusive thoughts.    Follow-up:    Clinician plans to work on interpersonal effectiveness and distress tolerance during the next session.   ?   Client response:     Client processed feelings on being in an overall better mood but with feelings of increased anxiety, recent triggers/stressors during a difficult month, recent Mother's Day experience with her child being sick, history of previous moments with illnesses and the  obsessive-compulsive thoughts experience at that time, the positive changes and ways that she has reduced her OCD type symptoms and ways that allowed her to handle the most recent illness in a way she has never done before.    Client was open, receptive, and engaged during the session. Client took an active role in the creation of their treatment plan.  Client seemed to make connections with today's discussion and exercises.     Patient acknowledged and verbally consented to continue with treatment with this Clinician and continue working on goals outlined in the current treatment plan.       Dictated using Dragon Speech Recognition.    Kvng Rondon LCSW  [facility]

## 2025-07-02 ENCOUNTER — OFFICE VISIT (OUTPATIENT)
Age: 30
End: 2025-07-02
Payer: COMMERCIAL

## 2025-07-02 DIAGNOSIS — F42.9 OBSESSIVE-COMPULSIVE DISORDER, UNSPECIFIED TYPE: ICD-10-CM

## 2025-07-02 DIAGNOSIS — F43.10 POST TRAUMATIC STRESS DISORDER (PTSD): Primary | ICD-10-CM

## 2025-07-02 DIAGNOSIS — F41.1 GAD (GENERALIZED ANXIETY DISORDER): ICD-10-CM

## 2025-07-02 NOTE — PROGRESS NOTES
"        Progress Note     Date: 07/02/2025   Client Name: Kenya Nicholson   MRN: [id number]   Start Time:    8:02 AM end Time:    8:56 AM    Diagnoses and all orders for this visit:    1. Post traumatic stress disorder (PTSD) (Primary)    2. JENNIFER (generalized anxiety disorder)    3. Obsessive-compulsive disorder, unspecified type          Active Symptoms/Chief Complainant:   Moments of anxiety, depressed mood, and obsessive compulsive intrusive thoughts associated with key moments of trauma    MENTAL STATUS EXAM   General Appearance:  Cleanly groomed and dressed  Eye Contact:  Good eye contact  Attitude:  Cooperative  Speech:  Normal rate, tone, volume  Mood and affect:  Normal, pleasant  Thought Process:  Logical  Associations/ Thought Content:  No delusions  Hallucinations:  None  Suicidal Ideations:  Not present  Homicidal Ideation:  Not present  Orientation:  Person, place and time  Insight:  Good  Judgement:  Good       Care Plan:  Goal:     \" I want to continue working on being able to increase the amount of time in a day where I am not having intrusive compulsive thoughts.\"     Objective:    Over the next 90 days, I will utilize learned skills and techniques to reduce obsessive-compulsive thoughts from 5 to 6 days a week to 4-5 days a week, as measured by Client reports and reductions in the JENNIFER 7 overall score.       Client reported making some progress on current care plan.  Ongoing treatment is still needed for this client due to goals not being fully realized or symptomology not being resolved.    Risk to self:  Low      Risk to others:  Low      Progress Note Intervention       Progress Note Intervention:     Clinician met with the Client at the [facility]. ?     Clinician utilized MI to assess and assist Client in processing through feelings, identify possible pathways for forward movement, and gain insight.     Clinician provided support through active listening, reflection, and validation. "     Clinician utilized? CBT technique of reframing to aid the client in shifting her perspective on some of today's discussed topics.  Clinician also utilized the DBT technique of AAA to aid the client in finding new connecting points to her awareness acceptance for more meaningful actions to aid her and also challenging obsessive-compulsive thoughts    Follow-up:    Clinician plans to work on assigned homework of journaling to do lists and accounting for moments in the day that were unpredictable to allow her with better acceptance over how she spends her day for discussion during the next session. ?     Client response:     Client processed feelings on recent ER visits and medical procedures, connecting points to previous moments of medical type traumas with a discussion about her pregnancy and the birth of her child, recent triggers/stressors to feelings of anxiety, black and white thinking, and obsessive compulsive thoughts/behaviors of avoidance, and other connecting points to where her expectations, from childhood experiences.    Client was open, receptive, and engaged during the session. Client seemed to make connections with today's discussion and exercises.    Patient acknowledged and verbally consented to continue with treatment with this Clinician and continue working on goals outlined in the current treatment plan.      Dictated using Dragon Speech Recognition.    Kvng Rondon LCSW  [facility]

## 2025-07-02 NOTE — PATIENT INSTRUCTIONS
"Client can practice the utilization of the DBT technique of AAA (awareness, acceptance, and action) by practicing the identification of all 3 phases of the cognitive triangle (thought, emotion, and behavior) and challenging hurtful or negative thoughts in writing or out loud for discussion during the next session.    Safety Plan:    Follow-up:  Client agrees to keep all follow-up appointments with [facility] and continue using support system as needed.       Resources:     Contact Office at [phone], [phone], 911, Text \"HOME\" to [phone], and/or go to the nearest Hospital/ER in the event of a crisis.     "

## 2025-07-25 DIAGNOSIS — F41.8 DEPRESSION WITH ANXIETY: ICD-10-CM

## 2025-07-28 RX ORDER — ESCITALOPRAM OXALATE 20 MG/1
20 TABLET ORAL DAILY
Qty: 90 TABLET | Refills: 1 | Status: SHIPPED | OUTPATIENT
Start: 2025-07-28

## 2025-07-30 ENCOUNTER — OFFICE VISIT (OUTPATIENT)
Age: 30
End: 2025-07-30
Payer: COMMERCIAL

## 2025-07-30 DIAGNOSIS — F43.10 POST TRAUMATIC STRESS DISORDER (PTSD): Primary | ICD-10-CM

## 2025-07-30 DIAGNOSIS — F42.9 OBSESSIVE-COMPULSIVE DISORDER, UNSPECIFIED TYPE: ICD-10-CM

## 2025-07-30 DIAGNOSIS — F41.1 GAD (GENERALIZED ANXIETY DISORDER): ICD-10-CM

## 2025-07-30 NOTE — PROGRESS NOTES
"        Progress Note     Date: 07/30/2025   Client Name: Kenya Nicholson   MRN: [id number]   Start Time:    8:05 AM end Time:    8:55 AM    Diagnoses and all orders for this visit:    1. Post traumatic stress disorder (PTSD) (Primary)    2. JENNIFER (generalized anxiety disorder)    3. Obsessive-compulsive disorder, unspecified type          Active Symptoms/Chief Complainant:   Moments of anxiety, depressed mood, and obsessive compulsive intrusive thoughts associated with key moments of trauma    MENTAL STATUS EXAM   General Appearance:  Cleanly groomed and dressed  Eye Contact:  Good eye contact  Attitude:  Cooperative  Speech:  Normal rate, tone, volume  Mood and affect:  Normal, pleasant  Thought Process:  Logical  Associations/ Thought Content:  No delusions  Hallucinations:  None  Suicidal Ideations:  Not present  Homicidal Ideation:  Not present  Orientation:  Person, place and time  Insight:  Good  Judgement:  Good       Care Plan:  Goal:     \" I want to continue working on being able to increase the amount of time in a day where I am not having intrusive compulsive thoughts.\"     Objective:    Over the next 90 days, I will utilize learned skills and techniques to reduce obsessive-compulsive thoughts from 5 to 6 days a week to 4-5 days a week, as measured by Client reports and reductions in the JENNIFER 7 overall score.       Client reported making some progress on current care plan.  Ongoing treatment is still needed for this client due to goals not being fully realized or symptomology not being resolved.    Risk to self:  Low      Risk to others:  Low      Progress Note Intervention       Progress Note Intervention:     Clinician met with the Client at the [facility]. ?     Clinician utilized MI to assess and assist Client in processing through feelings, identify possible pathways for forward movement, and gain insight.     Clinician provided support through active listening, reflection, and validation. "     Clinician utilized? CBT technique of reframing to aid the client shifting her perspective on some of today's discussed topics clinician also provided psychoeducation on the effects of the chemicals in the brain and how medication can affect those chemicals.    Follow-up:    Clinician plans to work on assigned homework of journaling what her boundaries for a new consistent structure with her child would be for discussion during the next session. ?     Client response:     Client processed feelings on recent illness, recent experience of running out of her medication, work, struggles with being able to feel higher levels of feelings of happier femi as well as sadness and the frustrations that come with that, connections to the past and moments of trauma, and recent struggles with feeling guilt during setting of boundaries with son.    Client was open, receptive, and engaged during the session. Client seemed to make connections with today's discussion and exercises.    Patient acknowledged and verbally consented to continue with treatment with this Clinician and continue working on goals outlined in the current treatment plan.      Dictated using Dragon Speech Recognition.    Kvng Rondon LCSW  [facility] [id number]

## 2025-07-30 NOTE — PATIENT INSTRUCTIONS
"Client can practice the utilization of the DBT technique of AAA (awareness, acceptance, and action) by practicing the identification of all 3 phases of the cognitive triangle (thought, emotion, and behavior) and challenging hurtful or negative thoughts in writing or out loud for discussion during the next session.    Safety Plan:    Follow-up:  Client agrees to keep all follow-up appointments with [facility] and continue using support system as needed.       Resources:     Contact Office at [phone], [phone], Text \"HOME\" to [phone], and/or go to the nearest Hospital/ER in the event of a crisis.     "

## 2025-08-18 ENCOUNTER — OFFICE VISIT (OUTPATIENT)
Dept: GASTROENTEROLOGY | Facility: CLINIC | Age: 30
End: 2025-08-18
Payer: COMMERCIAL

## 2025-08-18 VITALS
SYSTOLIC BLOOD PRESSURE: 124 MMHG | DIASTOLIC BLOOD PRESSURE: 88 MMHG | TEMPERATURE: 97.1 F | HEIGHT: 65 IN | HEART RATE: 54 BPM | WEIGHT: 225.2 LBS | OXYGEN SATURATION: 100 % | BODY MASS INDEX: 37.52 KG/M2

## 2025-08-18 DIAGNOSIS — K58.0 IRRITABLE BOWEL SYNDROME WITH DIARRHEA: Primary | ICD-10-CM

## 2025-08-18 PROCEDURE — 99214 OFFICE O/P EST MOD 30 MIN: CPT | Performed by: PHYSICIAN ASSISTANT

## 2025-08-18 RX ORDER — DICYCLOMINE HYDROCHLORIDE 10 MG/1
10 CAPSULE ORAL
Qty: 120 CAPSULE | Refills: 1 | Status: SHIPPED | OUTPATIENT
Start: 2025-08-18

## 2025-08-20 ENCOUNTER — OFFICE VISIT (OUTPATIENT)
Age: 30
End: 2025-08-20
Payer: COMMERCIAL

## 2025-08-20 DIAGNOSIS — F42.9 OBSESSIVE-COMPULSIVE DISORDER, UNSPECIFIED TYPE: ICD-10-CM

## 2025-08-20 DIAGNOSIS — F43.10 POST TRAUMATIC STRESS DISORDER (PTSD): Primary | ICD-10-CM

## 2025-08-20 DIAGNOSIS — F41.1 GAD (GENERALIZED ANXIETY DISORDER): ICD-10-CM
